# Patient Record
Sex: FEMALE | Race: WHITE | Employment: OTHER | URBAN - METROPOLITAN AREA
[De-identification: names, ages, dates, MRNs, and addresses within clinical notes are randomized per-mention and may not be internally consistent; named-entity substitution may affect disease eponyms.]

---

## 2020-06-30 ENCOUNTER — HOSPITAL ENCOUNTER (OUTPATIENT)
Dept: VASCULAR LAB | Age: 65
Discharge: HOME | End: 2020-06-30
Payer: MEDICARE

## 2020-06-30 PROCEDURE — 93971 EXTREMITY STUDY: CPT

## 2020-10-13 ENCOUNTER — HOSPITAL ENCOUNTER (OUTPATIENT)
Dept: MAMMOGRAPHY | Age: 65
Discharge: HOME | End: 2020-10-18
Payer: MEDICARE

## 2021-03-02 ENCOUNTER — HOSPITAL ENCOUNTER (OUTPATIENT)
Dept: GENERAL RADIOLOGY | Age: 66
Discharge: HOME | End: 2021-03-02
Payer: MEDICARE

## 2021-03-02 ENCOUNTER — HOSPITAL ENCOUNTER (OUTPATIENT)
Age: 66
Discharge: HOME | End: 2021-03-02
Payer: MEDICARE

## 2021-03-02 DIAGNOSIS — M25.50 PAIN IN JOINT, MULTIPLE SITES: ICD-10-CM

## 2021-03-02 PROCEDURE — 73130 X-RAY EXAM OF HAND: CPT

## 2021-04-17 ENCOUNTER — HOSPITAL ENCOUNTER (EMERGENCY)
Age: 66
Discharge: HOME | End: 2021-04-18
Attending: EMERGENCY MEDICINE
Payer: MEDICARE

## 2021-04-17 VITALS
WEIGHT: 190 LBS | SYSTOLIC BLOOD PRESSURE: 160 MMHG | HEIGHT: 66 IN | BODY MASS INDEX: 30.53 KG/M2 | RESPIRATION RATE: 20 BRPM | DIASTOLIC BLOOD PRESSURE: 82 MMHG | TEMPERATURE: 97.5 F | HEART RATE: 91 BPM

## 2021-04-17 DIAGNOSIS — M79.2 NEUROPATHIC PAIN: ICD-10-CM

## 2021-04-17 DIAGNOSIS — E77.8 HYPOPROTEINEMIA (HCC): ICD-10-CM

## 2021-04-17 DIAGNOSIS — R60.9 PERIPHERAL EDEMA: Primary | ICD-10-CM

## 2021-04-17 LAB
A/G RATIO: 1.4 (ref 1.1–2.2)
ALBUMIN SERPL-MCNC: 3.4 G/DL (ref 3.4–5)
ALP BLD-CCNC: 80 U/L (ref 40–129)
ALT SERPL-CCNC: 30 U/L (ref 10–40)
ANION GAP SERPL CALCULATED.3IONS-SCNC: 10 MMOL/L (ref 3–16)
AST SERPL-CCNC: 40 U/L (ref 15–37)
BILIRUB SERPL-MCNC: 0.3 MG/DL (ref 0–1)
BUN BLDV-MCNC: 20 MG/DL (ref 7–20)
CALCIUM SERPL-MCNC: 9 MG/DL (ref 8.3–10.6)
CHLORIDE BLD-SCNC: 110 MMOL/L (ref 99–110)
CO2: 21 MMOL/L (ref 21–32)
CREAT SERPL-MCNC: 1 MG/DL (ref 0.6–1.2)
GFR AFRICAN AMERICAN: >60
GFR NON-AFRICAN AMERICAN: 56
GLOBULIN: 2.5 G/DL
GLUCOSE BLD-MCNC: 121 MG/DL (ref 70–99)
POTASSIUM REFLEX MAGNESIUM: 3.5 MMOL/L (ref 3.5–5.1)
PRO-BNP: 448 PG/ML (ref 0–124)
SODIUM BLD-SCNC: 141 MMOL/L (ref 136–145)
TOTAL PROTEIN: 5.9 G/DL (ref 6.4–8.2)

## 2021-04-17 PROCEDURE — 99282 EMERGENCY DEPT VISIT SF MDM: CPT

## 2021-04-17 PROCEDURE — 83735 ASSAY OF MAGNESIUM: CPT

## 2021-04-17 PROCEDURE — 80053 COMPREHEN METABOLIC PANEL: CPT

## 2021-04-17 PROCEDURE — 85025 COMPLETE CBC W/AUTO DIFF WBC: CPT

## 2021-04-17 PROCEDURE — 83880 ASSAY OF NATRIURETIC PEPTIDE: CPT

## 2021-04-18 LAB
ATYPICAL LYMPHOCYTE RELATIVE PERCENT: 1 % (ref 0–6)
BANDED NEUTROPHILS RELATIVE PERCENT: 3 % (ref 0–7)
BASOPHILS ABSOLUTE: 0 K/UL (ref 0–0.2)
BASOPHILS RELATIVE PERCENT: 0 %
BILIRUBIN URINE: NEGATIVE
BLOOD, URINE: NEGATIVE
CLARITY: CLEAR
COLOR: YELLOW
EOSINOPHILS ABSOLUTE: 0.3 K/UL (ref 0–0.6)
EOSINOPHILS RELATIVE PERCENT: 3 %
EPITHELIAL CELLS, UA: NORMAL /HPF (ref 0–5)
GLUCOSE URINE: NEGATIVE MG/DL
HCT VFR BLD CALC: 37.4 % (ref 36–48)
HEMOGLOBIN: 12.5 G/DL (ref 12–16)
KETONES, URINE: NEGATIVE MG/DL
LEUKOCYTE ESTERASE, URINE: ABNORMAL
LYMPHOCYTES ABSOLUTE: 1.1 K/UL (ref 1–5.1)
LYMPHOCYTES RELATIVE PERCENT: 11 %
MAGNESIUM: 1.9 MG/DL (ref 1.8–2.4)
MCH RBC QN AUTO: 31 PG (ref 26–34)
MCHC RBC AUTO-ENTMCNC: 33.4 G/DL (ref 31–36)
MCV RBC AUTO: 92.6 FL (ref 80–100)
MICROSCOPIC EXAMINATION: YES
MONOCYTES ABSOLUTE: 0.5 K/UL (ref 0–1.3)
MONOCYTES RELATIVE PERCENT: 6 %
NEUTROPHILS ABSOLUTE: 7 K/UL (ref 1.7–7.7)
NEUTROPHILS RELATIVE PERCENT: 76 %
NITRITE, URINE: NEGATIVE
PDW BLD-RTO: 13.4 % (ref 12.4–15.4)
PH UA: 5.5 (ref 5–8)
PLATELET # BLD: 378 K/UL (ref 135–450)
PLATELET SLIDE REVIEW: ADEQUATE
PMV BLD AUTO: 7.4 FL (ref 5–10.5)
PROTEIN UA: NEGATIVE MG/DL
RBC # BLD: 4.04 M/UL (ref 4–5.2)
RBC # BLD: NORMAL 10*6/UL
RBC UA: NORMAL /HPF (ref 0–4)
SPECIFIC GRAVITY UA: 1.02 (ref 1–1.03)
SPECIMEN STATUS: NORMAL
URINE REFLEX TO CULTURE: ABNORMAL
URINE TYPE: ABNORMAL
UROBILINOGEN, URINE: 0.2 E.U./DL
WBC # BLD: 8.9 K/UL (ref 4–11)
WBC UA: NORMAL /HPF (ref 0–5)

## 2021-04-18 PROCEDURE — 81001 URINALYSIS AUTO W/SCOPE: CPT

## 2021-04-18 RX ORDER — FUROSEMIDE 20 MG/1
20 TABLET ORAL DAILY
Qty: 5 TABLET | Refills: 0 | Status: SHIPPED | OUTPATIENT
Start: 2021-04-18 | End: 2021-09-07

## 2021-04-18 NOTE — ED NOTES
Pt arrived via private transport. Pt ambulated without assistance to room 14 from [facility].      Vanessa Velazco RN  04/18/21 0779

## 2021-04-18 NOTE — ED NOTES
Attempted to ambulate pt to the bathroom. Pt unable to walk more than 10 feet. Pt complains of bilateral knee pain. Urine sample obtained from pt and sent to lab.       Wing Calabrese  04/18/21 0057

## 2021-04-18 NOTE — ED PROVIDER NOTES
Jewish service: Not on file     Active member of club or organization: Not on file     Attends meetings of clubs or organizations: Not on file     Relationship status: Not on file    Intimate partner violence     Fear of current or ex partner: Not on file     Emotionally abused: Not on file     Physically abused: Not on file     Forced sexual activity: Not on file   Other Topics Concern    Not on file   Social History Narrative    Not on file       Nursing notes reviewed. ED Triage Vitals   Enc Vitals Group      BP 04/17/21 2302 (!) 160/82      Pulse 04/17/21 2302 91      Resp 04/17/21 2300 20      Temp 04/17/21 2300 97.5 °F (36.4 °C)      Temp Source 04/17/21 2300 Oral      SpO2 --       Weight 04/17/21 2300 190 lb (86.2 kg)      Height 04/17/21 2300 5' 6\" (1.676 m)      Head Circumference --       Peak Flow --       Pain Score --       Pain Loc --       Pain Edu? --       Excl. in GC? --        GENERAL:  Awake, alert. Well developed, well nourished with no apparent distress. HENT:  Normocephalic, Atraumatic, moist mucous membranes. EYES:  Pupils equal round and reactive to light, Conjunctiva normal, extraocular movements normal.  NECK:  No meningeal signs, Supple. CHEST:  Regular rate and rhythm, chest wall non-tender. LUNGS:  Clear to auscultation bilaterally. ABDOMEN:  Soft, non-tender, no rebound, rigidity or guarding, non-distended, normal bowel sounds. No costovertebral angle tenderness to palpation. BACK:  No tenderness. EXTREMITIES:  Normal range of motion, bilateral upper and lower extremity edema, no bony tenderness, no deformity, distal pulses present. SKIN: Warm, dry and intact. NEUROLOGIC: Normal mental status. Moving all extremities to command.      LABS  Labs Reviewed   COMPREHENSIVE METABOLIC PANEL W/ REFLEX TO MG FOR LOW K - Abnormal; Notable for the following components:       Result Value    Glucose 121 (*)     GFR Non- 56 (*)     Total Protein 5.9 (*)     AST 40 (*)     All other components within normal limits    Narrative:     Performed at:  [address],  [place], [address] [employer]   Phone [phone]   BRAIN NATRIURETIC PEPTIDE - Abnormal; Notable for the following components:    Pro- (*)     All other components within normal limits    Narrative:     Performed at:  [address],  [place], [address]   Phone [phone]   URINE RT REFLEX TO CULTURE - Abnormal; Notable for the following components:    Leukocyte Esterase, Urine TRACE (*)     All other components within normal limits    Narrative:     Performed at:  [address],  [place], [address]   Phone [phone]   CBC WITH AUTO DIFFERENTIAL    Narrative:     Performed at:  [address],  [place], [address]   Phone [phone]   SAMPLE POSSIBLE BLOOD BANK TESTING    Narrative:     Performed at:  [address],  [place], [address]   Phone [phone]   MAGNESIUM    Narrative:     Performed at:  [address],  [place], [address]   Phone [phone]   MICROSCOPIC URINALYSIS    Narrative:     Performed at:  [address],  [place], [address]   Phone [phone]       [address]        I advised the patient to return to the emergency department immediately for any new or worsening symptoms, such as chest pain, shortness of breath or fever. The patient voiced agreement and understanding of the treatment plan. I estimate there is LOW risk for COMPARTMENT SYNDROME, DEEP VENOUS THROMBOSIS, SEPTIC ARTHRITIS, TENDON OR NEUROVASCULAR INJURY, thus I consider the discharge disposition reasonable.  Josefa Desai and I have discussed the diagnosis

## 2021-04-29 ENCOUNTER — OFFICE VISIT (OUTPATIENT)
Dept: RHEUMATOLOGY | Age: 66
End: 2021-04-29
Payer: MEDICARE

## 2021-04-29 VITALS
HEIGHT: 66 IN | BODY MASS INDEX: 30.08 KG/M2 | SYSTOLIC BLOOD PRESSURE: 120 MMHG | DIASTOLIC BLOOD PRESSURE: 76 MMHG | WEIGHT: 187.2 LBS

## 2021-04-29 DIAGNOSIS — M06.4 INFLAMMATORY POLYARTHRITIS (HCC): ICD-10-CM

## 2021-04-29 DIAGNOSIS — M06.4 INFLAMMATORY POLYARTHRITIS (HCC): Primary | ICD-10-CM

## 2021-04-29 LAB
ALBUMIN SERPL-MCNC: 4.1 G/DL (ref 3.4–5)
ALP BLD-CCNC: 89 U/L (ref 40–129)
ALT SERPL-CCNC: 18 U/L (ref 10–40)
AST SERPL-CCNC: 21 U/L (ref 15–37)
BILIRUB SERPL-MCNC: 0.4 MG/DL (ref 0–1)
BILIRUBIN DIRECT: <0.2 MG/DL (ref 0–0.3)
BILIRUBIN, INDIRECT: NORMAL MG/DL (ref 0–1)
C-REACTIVE PROTEIN: 64.6 MG/L (ref 0–5.1)
HBV SURFACE AB TITR SER: <3.5 MIU/ML
HEPATITIS B CORE IGM ANTIBODY: NORMAL
HEPATITIS B SURFACE ANTIGEN INTERPRETATION: NORMAL
HEPATITIS C ANTIBODY INTERPRETATION: NORMAL
RHEUMATOID FACTOR: 119 IU/ML
SEDIMENTATION RATE, ERYTHROCYTE: 37 MM/HR (ref 0–30)
TOTAL PROTEIN: 6.4 G/DL (ref 6.4–8.2)

## 2021-04-29 PROCEDURE — 99244 OFF/OP CNSLTJ NEW/EST MOD 40: CPT | Performed by: INTERNAL MEDICINE

## 2021-04-29 PROCEDURE — G8417 CALC BMI ABV UP PARAM F/U: HCPCS | Performed by: INTERNAL MEDICINE

## 2021-04-29 PROCEDURE — G8427 DOCREV CUR MEDS BY ELIG CLIN: HCPCS | Performed by: INTERNAL MEDICINE

## 2021-04-29 RX ORDER — PREDNISONE 10 MG/1
TABLET ORAL
Qty: 60 TABLET | Refills: 0 | Status: SHIPPED | OUTPATIENT
Start: 2021-04-29 | End: 2022-04-06 | Stop reason: SDUPTHER

## 2021-04-29 RX ORDER — ATORVASTATIN CALCIUM 40 MG/1
40 TABLET, FILM COATED ORAL DAILY
COMMUNITY

## 2021-04-29 RX ORDER — LISINOPRIL 20 MG/1
20 TABLET ORAL DAILY
COMMUNITY

## 2021-04-29 NOTE — PROGRESS NOTES
[address], [place]                                                           [address] [place] [address]                                                              (P) [phone] (F)      Note is transcribed using voice recognition software. Inadvertent computerized transcription errors may be present. Patient identification: adan Kumar : 1955,65 y.o. REASON FOR CONSULTATION:  Patient is being seen at the request of  Justin Nolasco / Chuck Alegria DO for evaluation of joint pain and swelling. HISTORY OF PRESENT ILLNESS:  60 y.o. female with remote history of rubella in her early childhood that caused decline in mental function, and hearing loss states that she was perfectly fine earlier this year-then suddenly started noticing pain, swelling, stiffness in her upper and lower extremity joints to the extent that she could not walk and in fact is using walker today. Multiple finger joints, wrist, knees, ankle and feet are swollen and actively inflamed. Symptoms are 24/7, worse at night in the morning. She went to urgent care and ED a few times, was prescribed pain meds and Celebrex with minimal or no relief. No history of any infections. No psoriasis or inflammatory bowel diseases. No rashes, photosensitivity, mucositis, Raynaud's, chest pain, shortness of breath, hypercoagulability. All other review of systems are negative. PMH, PSH,Social history , Meds reviewed. FH-grandmother and aunt has rheumatoid arthritis. PHYSICAL EXAM:    Vitals:    /76   Ht 5' 6\" (1.676 m)   Wt 187 lb 3.2 oz (84.9 kg)   BMI 30.21 kg/m²   AA)x3 well nourished, and well groomed, normal judgement.     MKS:   28 joint JOINT COUNT: Right                                                  Left   Swell Tender Swell Tender   PIP1      x  x   PIP2  xx  x  x  x   PIP3  x  x  xx  xx   PIP4 x x  x  x   PIP5          MCP1           MCP2  xx  xx  x  x   MCP3  x x x  x   MCP4      x  x   MCP5  x  x       Wrist  x  x    x   Elbow   x    x   Shoulder    x      Knee  xx  x  x  x     Loose fist bilaterally. Range of motion are limited in her wrist, shoulders, knees. Ankle are swollen and tender bilaterally. Multiple MTP joints are tender bilaterally with dorsal swelling. Very limited range of motion in her hips. Using walker for ambulation. Skin: No rashes, no induration or skin thickening or nodules. HEENT: Normal lids, lacrimal glands and pupils. Salivary glands reveal no evidence of abnormality. External inspection of the ears and nose within normal limits. Neck:  Chest: Normal effort  Heart:    Abdomen:   Lymph nodes:   Neurologic:     DATA:       ASSESSMENT AND PLAN:  Steffany Rivera was seen today for establish care and joint pain. Diagnoses and all orders for this visit:    Inflammatory [facility] ([facility]  -     C-Reactive Protein; Future  -     Sedimentation Rate; Future  -     Cyclic Citrul Peptide Antibody, IgG; Future  -     Rheumatoid Factor; Future  -     ADIEL; Future  -     Hepatitis B Surface Antibody; Future  -     Hepatitis B Core Antibody, IgM; Future  -     Hepatitis B Surface Antigen; Future  -     Hepatitis C Antibody; Future  -     Hepatic Function Panel; Future    Other orders  -     predniSONE (DELTASONE) 10 MG tablet; 2 tab po daily po daily. Active inflammatory polyarthritis-approximately 3 months duration. Clinically looks like rheumatoid arthritis. Plan-  Obtain work-up as above for inflammatory polyarthritis. Therapeutically-I gave her prednisone taper, take 4 tablets daily for 4 days, then stay on 2 tablets daily thereafter.   I will see her back in couple weeks to go over labs and formulate further treatment plan. All her questions were answered. Patient was accompanied by her half sister and her daughter joined me through [facility]. #################################################################################################  Patient indicates understanding and agrees with the management plan. Total time >60 minutes that includes the following-  Preparing to see the patient such as reviewing patients records, pre-charting, preparing the visit on the same day, performing a medically appropriate history and physical examination, counseling and educating patient about diagnosis, management plan, ordering appropriate testings, prescriptions, communicating findings to other care providers, and documenting clinical information in electronic medical record. I thank you for giving me the opportunity to be involved in [facility] and I look forward following Stephanie Vargas along with you. If you have any questions or concerns please feel free to contact me at any time.   Bogdan Rivas MD 04/29/21

## 2021-04-30 LAB
ANTI-NUCLEAR ANTIBODY (ANA): NEGATIVE
CYCLIC CITRULLINATED PEPTIDE ANTIBODY IGG: 283.2 U/ML (ref 0–2.9)

## 2021-05-10 NOTE — PROGRESS NOTES
[address], [place]                                                           [address] [place] [address]                                                             [phone] (P) [phone] (F)      Note is transcribed using voice recognition software. Inadvertent computerized transcription errors may be present. Patient identification: Janelle Donato, female : 1955,65 y.o. Background information-  1. Seropositive rheumatoid arthritis (, )-onset   2. Childhood history of rubeola-caused hearing loss and mental impairment      Subjective-we met couple of weeks ago for evaluation of inflammatory polyarthritis. Work-up revealed seropositive rheumatoid arthritis. She is doing much better on prednisone taper, still has discomfort in her knees and ankles. Is currently taking 20 mg of prednisone a day. Tolerating medications well. Is no longer on Celebrex and acetaminophen. No other complaints or concerns. PMH, PSH,Social history , Meds reviewed. FH-grandmother and aunt has rheumatoid arthritis. Current Outpatient Medications   Medication Sig Dispense Refill    folic acid (FOLVITE) 1 MG tablet Take 1 tab po daily. 30 tablet 3    methotrexate (RHEUMATREX) 2.5 MG chemo tablet Week 1: 4 tablets by mouth once a week. Week 2: 5 tablets /week. Week 3: 6 tablets/ week, thereafter: 7 tablets/ week. Same day of the week every week. Safety alert: Labs as recommended by prescribing MD. 22 tablet 0    predniSONE (DELTASONE) 10 MG tablet Take 1 tab po daily.  30 tablet 1    lisinopril (PRINIVIL;ZESTRIL) 20 MG tablet Take 20 mg by mouth daily      atorvastatin (LIPITOR) 40 MG tablet Take 40 mg by mouth daily      predniSONE (DELTASONE) 10 MG tablet 2 tab po daily po daily. 60 tablet 0    furosemide (LASIX) 20 MG tablet Take 1 tablet by mouth daily for 5 days 5 tablet 0     No current facility-administered medications for this visit. PHYSICAL EXAM:    Vitals:    BP 98/68   Ht 5' 6\" (1.676 m)   Wt 187 lb (84.8 kg)   BMI 30.18 kg/m²   AA)x3 well nourished, and well groomed, normal judgement. MKS:   28 joint JOINT COUNT:                               Right                                                  Left   Swell Tender Swell Tender   PIP1           PIP2          PIP3           PIP4         PIP5          MCP1           MCP2  x  x      MCP3         MCP4           MCP5           Wrist          Elbow          Shoulder          Knee    x    x     Subjective discomfort in both ankle joint lines. Otherwise has full range of motion in upper and lower extremity joints. Normal gait and muscle strength in upper and lower extremities. Skin: No rashes, no induration or skin thickening or nodules. HEENT: Normal lids, lacrimal glands and pupils. Salivary glands reveal no evidence of abnormality.  External inspection of the ears and nose within normal     DATA:   Lab Results   Component Value Date    WBC 8.9 04/17/2021    HGB 12.5 04/17/2021    HCT 37.4 04/17/2021    MCV 92.6 04/17/2021     04/17/2021     Lab Results   Component Value Date     04/17/2021    K 3.5 04/17/2021     04/17/2021    CO2 21 04/17/2021    BUN 20 04/17/2021    CREATININE 1.0 04/17/2021    GLUCOSE 121 (H) 04/17/2021    CALCIUM 9.0 04/17/2021    PROT 6.4 04/29/2021    LABALBU 4.1 04/29/2021    BILITOT 0.4 04/29/2021    ALKPHOS 89 04/29/2021    AST 21 04/29/2021    ALT 18 04/29/2021    LABGLOM 56 (A) 04/17/2021    GFRAA >60 04/17/2021    AGRATIO 1.4 04/17/2021    GLOB 2.5 04/17/2021       Lab Results   Component Value Date    SEDRATE 37 (H) 04/29/2021     Lab Results   Component Value Date    CRP 64.6 (H) 04/29/2021       ASSESSMENT AND PLAN:  Avtar Baker was seen today for joint pain.    Diagnoses and all orders for this visit:    Seropositive rheumatoid arthritis ([facility]    High risk medication use  -     AST(SGOT) & ALT(SGPT); Standing  -     CBC Auto Differential; Standing  -     C-Reactive Protein; Standing  -     Creatinine, Serum; Standing  -     Sedimentation Rate; Standing    Other orders  -     folic acid (FOLVITE) 1 MG tablet; Take 1 tab po daily. -     methotrexate (RHEUMATREX) 2.5 MG chemo tablet; Week 1: 4 tablets by mouth once a week. Week 2: 5 tablets /week. Week 3: 6 tablets/ week, thereafter: 7 tablets/ week. Same day of the week every week. Safety alert: Labs as recommended by prescribing MD.  -     predniSONE (DELTASONE) 10 MG tablet; Take 1 tab po daily. Seropositive rheumatoid arthritis-improving on prednisone taper. Plan-  Went over various treatment options for inflammatory arthritis including non-biologic and biologic disease modifiers. Recommend Methotrexate as a first line of agent. Risk, benefits and side effects fully explained including but not limited the immune suppression, infection risk, BM,GI/Liver/lung/renal toxicity, malignancy. This is a high risk medication- requires periodic blood monitoring & office visits,  alcohol abstinence, hold in case of any infection. Please read package insert and call us with any question. Methotrexate, folic acid and prednisone sent to pharmacy. Lab work to be completed in 4 weeks, call us for med refill, office visit in 2 months. Patient verbalizes the understanding. #################################################################################################  Patient indicates understanding and agrees with the management plan.   Total time >42 minutes that includes the following-  Preparing to see the patient such as reviewing patients records, pre-charting, preparing the visit on the same day, performing a medically appropriate history and physical examination, counseling and educating patient about diagnosis, management plan, ordering appropriate testings, prescriptions, communicating findings to other care providers, and documenting clinical information in electronic medical record. I thank you for giving me the opportunity to be involved in [facility] and I look forward following Ria El along with you. If you have any questions or concerns please feel free to contact me at any time.   Va Bryant MD 05/11/21

## 2021-05-11 ENCOUNTER — OFFICE VISIT (OUTPATIENT)
Dept: RHEUMATOLOGY | Age: 66
End: 2021-05-11
Payer: MEDICARE

## 2021-05-11 VITALS
SYSTOLIC BLOOD PRESSURE: 98 MMHG | DIASTOLIC BLOOD PRESSURE: 68 MMHG | WEIGHT: 187 LBS | HEIGHT: 66 IN | BODY MASS INDEX: 30.05 KG/M2

## 2021-05-11 DIAGNOSIS — M05.9 SEROPOSITIVE RHEUMATOID ARTHRITIS (HCC): Primary | ICD-10-CM

## 2021-05-11 DIAGNOSIS — Z79.899 HIGH RISK MEDICATION USE: ICD-10-CM

## 2021-05-11 PROCEDURE — 99215 OFFICE O/P EST HI 40 MIN: CPT | Performed by: INTERNAL MEDICINE

## 2021-05-11 PROCEDURE — 1036F TOBACCO NON-USER: CPT | Performed by: INTERNAL MEDICINE

## 2021-05-11 PROCEDURE — 3017F COLORECTAL CA SCREEN DOC REV: CPT | Performed by: INTERNAL MEDICINE

## 2021-05-11 PROCEDURE — G8417 CALC BMI ABV UP PARAM F/U: HCPCS | Performed by: INTERNAL MEDICINE

## 2021-05-11 PROCEDURE — G8427 DOCREV CUR MEDS BY ELIG CLIN: HCPCS | Performed by: INTERNAL MEDICINE

## 2021-05-11 RX ORDER — PREDNISONE 10 MG/1
TABLET ORAL
Qty: 30 TABLET | Refills: 1 | Status: SHIPPED | OUTPATIENT
Start: 2021-05-11 | End: 2022-03-29 | Stop reason: SDUPTHER

## 2021-05-11 RX ORDER — FOLIC ACID 1 MG/1
TABLET ORAL
Qty: 30 TABLET | Refills: 3 | Status: SHIPPED | OUTPATIENT
Start: 2021-05-11 | End: 2021-09-07 | Stop reason: SDUPTHER

## 2021-05-11 NOTE — PATIENT INSTRUCTIONS
Methotrexate instructions-    1. PLEASE TAKE ALL TABLETS AT ONCE OR IF YOU ARE UNABLE TO TOLERATE ALL AT ONCE- CAN SPLIT HALF OF THE DOSE IN THE MORNING AND HALF IN THE EVENING OF THE SAME DAY. Week 1- 4 tablets by mouth once a week       Week 2- 5 tablets by mouth once a week        Week 3- 6 tablets by mouth once a week       Week 4 onwards take 7 tablets by mouth once a week. 2. Folic acid 1 mg daily as long as you are taking Methotrexate. 3. If you are unable to tolerate full dose of Methotrexate due to nausea/vomiting, diarrhea or other side effects, do not increase the dose instead decrease the number of tablets you are taking once a week. 4.  Blood work monthly ( CB CD, CMP)  to monitor systemic side effects. 5. IF YOU DO NOT HAVE METHOTREXATE REFILLS< PLEASE CALL OFFICE. PLEASE CALL WITH ANY QUESTIONS OR CONCERNS    Patient Education        methotrexate (oral)  Pronunciation:  meth oh TREX ate  Brand:  [employer]  What is the most important information I should know about methotrexate? Methotrexate may cause injury or death to an unborn baby and should not be used during pregnancy to treat arthritis or psoriasis. Methotrexate is sometimes used to treat cancer during pregnancy. Tell your doctor if you are pregnant or plan to become pregnant. Do not use methotrexate to treat psoriasis or rheumatoid arthritis if you have low blood cell counts, a weak immune system, alcoholism or chronic liver disease, or if you are breastfeeding. YOU MAY NOT NEED TO TAKE METHOTREXATE EVERY DAY. Some people have  after incorrectly taking methotrexate every day. You must use the correct dose for your condition. Methotrexate can cause serious or fatal side effects.  Tell your doctor if you have diarrhea, mouth sores, cough, shortness of breath, upper stomach pain, dark urine, numbness or tingling, muscle weakness, confusion, seizure, or skin rash that spreads and causes blistering and is able to get pregnant. Keep using birth control for at least 3 months after your last dose. · Tell your doctor right away if a pregnancy occurs while either the mother or the father is using methotrexate. This medicine may affect fertility (ability to have children) in both men and women. However, it is important to use birth control to prevent pregnancy because methotrexate may harm the baby if a pregnancy does occur. Do not breastfeed while using this medicine,  and for at least 1 week after your last dose. Do not give this medicine to a child without the advice of a doctor. How should I take methotrexate? YOU MAY NOT NEED TO TAKE METHOTREXATE EVERY DAY. This medicine is sometimes taken only once per week, or 2 to 4 times per week. Some conditions are treated daily for just a few days followed by a rest period of 1 week or longer before taking the medicine again. You must use the correct dose. Some people have  after incorrectly taking methotrexate every day. Follow all directions on your prescription label and read all medication guides or instruction sheets. Use the medicine exactly as directed. Measure liquid medicine carefully. Use the dosing syringe provided, or use a medicine dose-measuring device (not a kitchen spoon). Methotrexate can be toxic to your organs, and may lower your blood cell counts. You will need frequent medical tests, and you may need an occasional liver biopsy or chest X-ray. Your cancer treatments may be delayed based on the results. If you need to be sedated for dental work, tell your dentist you currently use methotrexate. Store methotrexate at room temperature away from moisture, heat, and light. You may store [place] in a refrigerator or at room temperature. Avoid freezing or high heat. Throw away any unused Xatmep after 60 days if kept at room temperature. What happens if I miss a dose? Call your doctor for instructions if you miss a dose of methotrexate.   What happens if I overdose? Seek emergency medical attention or call the Poison Help line at 1-279.850.7100. An overdose of methotrexate can be fatal.  Overdose symptoms may include bruising or unusual bleeding, mouth sores, vomiting, little or no urination, bloody or tarry stools, or coughing up blood or vomit that looks like coffee grounds. What should I avoid while taking methotrexate? Avoid drinking alcohol. It may increase your risk of liver damage. Do not receive a \"live\" vaccine while using methotrexate, or you could develop a serious infection. Live vaccines include measles, mumps, rubella (MMR), rotavirus, typhoid, yellow fever, varicella (chickenpox), zoster (shingles), and nasal flu (influenza) vaccine. Methotrexate could make you sunburn more easily. Avoid sunlight or tanning beds. Wear protective clothing and use sunscreen (SPF 30 or higher) when you are outdoors. What are the possible side effects of methotrexate? Get emergency medical help if you have signs of an allergic reaction (hives, difficult breathing, swelling in your face or throat) or a severe skin reaction (fever, sore throat, burning in your eyes, skin pain, red or purple skin rash that spreads and causes blistering and peeling). Methotrexate can cause serious or fatal side effects.  Call your doctor at once if you have:  · sudden chest pain, wheezing, dry cough, cough with mucus, chest pain, feeling short of breath;  · fever, chills, swollen lymph glands, night sweats, weight loss;  · blisters or ulcers in your mouth, red or swollen gums, trouble swallowing;  · vomiting, diarrhea, blood in your urine or stools;  · skin changes such as redness, warmth, swelling, or oozing;  · low blood cell counts --fever, chills, tiredness, mouth sores, skin sores, easy bruising, unusual bleeding, pale skin, cold hands and feet, feeling light-headed or short of breath;  · kidney problems --little or no urination, swelling in your feet or ankles;  · liver problems --swelling around your midsection, right-sided upper stomach pain, nausea, loss of appetite, dark urine, jaundice (yellowing of the skin or eyes);  · nerve problems --confusion, weakness, drowsiness, coordination problems, feeling irritable, headache, neck stiffness, vision problems, loss of movement in any part of your body, seizure; or  · signs of tumor cell breakdown --tiredness, weakness, muscle cramps, nausea, vomiting, diarrhea, fast or slow heart rate, tingling in your hands and feet or around your mouth. Common side effects may include:  · fever, chills, tiredness, not feeling well;  · low blood cell counts;  · mouth sores;  · nausea, stomach pain;  · abnormal liver function tests;  · hair loss;  · burning skin lesions; or  · being more sensitive to light. This is not a complete list of side effects and others may occur. Call your doctor for medical advice about side effects. You may report side effects to FDA at 8-688-FDA-9750. What other drugs will affect methotrexate? Tell your doctor about all your other medicines, especially:  · an antibiotic or sulfa drug;  · folic acid;  · mercaptopurine;  · theophylline;  · NSAIDs (nonsteroidal anti-inflammatory drugs) --aspirin, ibuprofen (Advil, Motrin), naproxen (Aleve), celecoxib, diclofenac, indomethacin, meloxicam, and others;  · stomach acid reducers --esomeprazole, lansoprazole, omeprazole, pantoprazole, Nexium, Prilosec, Protonix, and others. Many drugs can affect methotrexate. This includes prescription and over-the-counter medicines, vitamins, and herbal products. Not all possible interactions are listed here. Tell your doctor about all your current medicines and any medicine you start or stop using. Where can I get more information? Your pharmacist can provide more information about methotrexate.   Remember, keep this and all other medicines out of the reach of children, never share your medicines with others, and use this medication

## 2021-06-04 ENCOUNTER — HOSPITAL ENCOUNTER (OUTPATIENT)
Age: 66
Discharge: HOME | End: 2021-06-04
Payer: MEDICARE

## 2021-06-04 DIAGNOSIS — Z79.899 HIGH RISK MEDICATION USE: ICD-10-CM

## 2021-06-04 LAB
ALT SERPL-CCNC: 26 U/L (ref 10–40)
AST SERPL-CCNC: 22 U/L (ref 15–37)
BASOPHILS ABSOLUTE: 0.1 K/UL (ref 0–0.2)
BASOPHILS RELATIVE PERCENT: 0.8 %
CREAT SERPL-MCNC: 0.8 MG/DL (ref 0.6–1.2)
EOSINOPHILS ABSOLUTE: 0 K/UL (ref 0–0.6)
EOSINOPHILS RELATIVE PERCENT: 0.4 %
GFR AFRICAN AMERICAN: >60
GFR NON-AFRICAN AMERICAN: >60
HCT VFR BLD CALC: 42.5 % (ref 36–48)
HEMOGLOBIN: 14.1 G/DL (ref 12–16)
LYMPHOCYTES ABSOLUTE: 1 K/UL (ref 1–5.1)
LYMPHOCYTES RELATIVE PERCENT: 11.6 %
MCH RBC QN AUTO: 31.5 PG (ref 26–34)
MCHC RBC AUTO-ENTMCNC: 33.1 G/DL (ref 31–36)
MCV RBC AUTO: 95.1 FL (ref 80–100)
MONOCYTES ABSOLUTE: 0.5 K/UL (ref 0–1.3)
MONOCYTES RELATIVE PERCENT: 6.2 %
NEUTROPHILS ABSOLUTE: 6.8 K/UL (ref 1.7–7.7)
NEUTROPHILS RELATIVE PERCENT: 81 %
PDW BLD-RTO: 15.8 % (ref 12.4–15.4)
PLATELET # BLD: 263 K/UL (ref 135–450)
PMV BLD AUTO: 7.9 FL (ref 5–10.5)
RBC # BLD: 4.47 M/UL (ref 4–5.2)
SEDIMENTATION RATE, ERYTHROCYTE: 10 MM/HR (ref 0–30)
WBC # BLD: 8.4 K/UL (ref 4–11)

## 2021-06-04 PROCEDURE — 85652 RBC SED RATE AUTOMATED: CPT

## 2021-06-04 PROCEDURE — 84460 ALANINE AMINO (ALT) (SGPT): CPT

## 2021-06-04 PROCEDURE — 36415 COLL VENOUS BLD VENIPUNCTURE: CPT

## 2021-06-04 PROCEDURE — 82565 ASSAY OF CREATININE: CPT

## 2021-06-04 PROCEDURE — 85025 COMPLETE CBC W/AUTO DIFF WBC: CPT

## 2021-06-04 PROCEDURE — 86140 C-REACTIVE PROTEIN: CPT

## 2021-06-04 PROCEDURE — 84450 TRANSFERASE (AST) (SGOT): CPT

## 2021-06-05 LAB — C-REACTIVE PROTEIN: <3 MG/L (ref 0–5.1)

## 2021-07-06 ENCOUNTER — OFFICE VISIT (OUTPATIENT)
Dept: RHEUMATOLOGY | Age: 66
End: 2021-07-06
Payer: MEDICARE

## 2021-07-06 VITALS
HEIGHT: 66 IN | SYSTOLIC BLOOD PRESSURE: 124 MMHG | DIASTOLIC BLOOD PRESSURE: 84 MMHG | WEIGHT: 191 LBS | BODY MASS INDEX: 30.7 KG/M2

## 2021-07-06 DIAGNOSIS — Z79.899 HIGH RISK MEDICATION USE: ICD-10-CM

## 2021-07-06 DIAGNOSIS — M05.9 SEROPOSITIVE RHEUMATOID ARTHRITIS (HCC): Primary | ICD-10-CM

## 2021-07-06 PROCEDURE — 1036F TOBACCO NON-USER: CPT | Performed by: INTERNAL MEDICINE

## 2021-07-06 PROCEDURE — 99214 OFFICE O/P EST MOD 30 MIN: CPT | Performed by: INTERNAL MEDICINE

## 2021-07-06 PROCEDURE — 1123F ACP DISCUSS/DSCN MKR DOCD: CPT | Performed by: INTERNAL MEDICINE

## 2021-07-06 PROCEDURE — 4040F PNEUMOC VAC/ADMIN/RCVD: CPT | Performed by: INTERNAL MEDICINE

## 2021-07-06 PROCEDURE — G8400 PT W/DXA NO RESULTS DOC: HCPCS | Performed by: INTERNAL MEDICINE

## 2021-07-06 PROCEDURE — 3017F COLORECTAL CA SCREEN DOC REV: CPT | Performed by: INTERNAL MEDICINE

## 2021-07-06 PROCEDURE — G8427 DOCREV CUR MEDS BY ELIG CLIN: HCPCS | Performed by: INTERNAL MEDICINE

## 2021-07-06 PROCEDURE — 1090F PRES/ABSN URINE INCON ASSESS: CPT | Performed by: INTERNAL MEDICINE

## 2021-07-06 PROCEDURE — G8417 CALC BMI ABV UP PARAM F/U: HCPCS | Performed by: INTERNAL MEDICINE

## 2021-07-06 RX ORDER — FOLIC ACID 1 MG/1
TABLET ORAL
Qty: 90 TABLET | Refills: 3 | Status: SHIPPED | OUTPATIENT
Start: 2021-07-06 | End: 2022-10-10 | Stop reason: SDUPTHER

## 2021-07-06 RX ORDER — PREDNISONE 10 MG/1
TABLET ORAL
Qty: 15 TABLET | Refills: 0 | Status: SHIPPED | OUTPATIENT
Start: 2021-07-06 | End: 2021-09-24 | Stop reason: SDUPTHER

## 2021-07-06 NOTE — PROGRESS NOTES
[facility], [place]                                                           [address] [place] [address] [phone] (P) [phone] (F)      Note is transcribed using voice recognition software. Inadvertent computerized transcription errors may be present. Patient identification: Fannie Cbua, female : 1955,66 y.o. Background information-  1. Seropositive rheumatoid arthritis (, )-onset   2. 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 Normal ADLs and recreational activities. Still taking 10 mg of prednisone, continues to take methotrexate 7 tablets weekly. Tolerating medications well. Safety labs have been stable. PMH, PSH,Social history , Meds reviewed. FH-grandmother and aunt has rheumatoid arthritis. Current Outpatient Medications   Medication Sig Dispense Refill    predniSONE (DELTASONE) 10 MG tablet Take 2 tab daily 4 days. 1 tab po daily 4 days. 1/2 tab po daily x 4 days and stop. 15 tablet 0    folic acid (FOLVITE) 1 MG tablet Take 1 tab po daily. 90 tablet 3    methotrexate (RHEUMATREX) 2.5 MG chemo tablet Take 8 tablets once a week on the same day. Divide the dose in the same day 4 in AM and 4 PM. 751 tablet 0    folic acid (FOLVITE) 1 MG tablet Take 1 tab po daily. 30 tablet 3    predniSONE (DELTASONE) 10 MG tablet Take 1 tab po daily.  30 tablet 1    lisinopril (PRINIVIL;ZESTRIL) 20 MG tablet Take 20 mg by mouth daily      atorvastatin (LIPITOR) 40 MG tablet Take 40 mg by mouth daily      predniSONE (DELTASONE) 10 MG tablet 2 tab po daily po daily. 60 tablet 0    furosemide (LASIX) 20 MG tablet Take 1 tablet by mouth daily for 5 days 5 tablet 0     No current facility-administered medications for this visit. PHYSICAL EXAM:    Vitals:    /84   Ht 5' 6\" (1.676 m)   Wt 191 lb (86.6 kg)   BMI 30.83 kg/m²   AA)x3 well nourished, and well groomed, normal judgement. MKS:   28 joint JOINT COUNT:                               Right                                                  Left   Swell Tender Swell Tender   PIP1           PIP2          PIP3   x        PIP4         PIP5          MCP1           MCP2  x  x      MCP3         MCP4           MCP5           Wrist          Elbow          Shoulder          Knee           Other than above-normal musculoskeletal exam.  Skin: No rashes, no induration or skin thickening or nodules. DATA:   Lab Results   Component Value Date    WBC 8.4 06/04/2021    HGB 14.1 06/04/2021    HCT 42.5 06/04/2021    MCV 95.1 06/04/2021     06/04/2021     Lab Results   Component Value Date     04/17/2021    K 3.5 04/17/2021     04/17/2021    CO2 21 04/17/2021    BUN 20 04/17/2021    CREATININE 0.8 06/04/2021    GLUCOSE 121 (H) 04/17/2021    CALCIUM 9.0 04/17/2021    PROT 6.4 04/29/2021    LABALBU 4.1 04/29/2021    BILITOT 0.4 04/29/2021    ALKPHOS 89 04/29/2021    AST 22 06/04/2021    ALT 26 06/04/2021    LABGLOM >60 06/04/2021    GFRAA >60 06/04/2021    AGRATIO 1.4 04/17/2021    GLOB 2.5 04/17/2021       Lab Results   Component Value Date    SEDRATE 10 06/04/2021     Lab Results   Component Value Date    CRP <3.0 06/04/2021       ASSESSMENT AND PLAN:  Marielle Maddox was seen today for follow-up. Diagnoses and all orders for this visit:    Seropositive rheumatoid arthritis ([facility] [address]    High risk medication use  -     AST(SGOT) & ALT(SGPT);  Standing  -     CBC Auto Differential; Standing  -     C-Reactive Protein; Standing  -     Creatinine, Serum; Standing  -     Sedimentation Rate; Standing    Other orders  -     Discontinue: methotrexate (RHEUMATREX) 2.5 MG chemo tablet; Take 8 tablets once a week on the same day. Divide the dose in the same day 4 in AM and 4 PM.  -     predniSONE (DELTASONE) 10 MG tablet; Take 2 tab daily 4 days. 1 tab po daily 4 days. 1/2 tab po daily x 4 days and stop. -     folic acid (FOLVITE) 1 MG tablet; Take 1 tab po daily. -     methotrexate (RHEUMATREX) 2.5 MG chemo tablet; Take 8 tablets once a week on the same day. Divide the dose in the same day 4 in AM and 4 PM.      Seropositive rheumatoid arthritis-doing well. Taper prednisone and discontinue. Increase methotrexate 8 tablets once a week. Blood work today, recheck labs in couple of months at the time of visit. All questions were answered. Patient verbalizes the understanding. #################################################################################################  Patient indicates understanding and agrees with the management plan. Total time >35 minutes that includes the following-  Preparing to see the patient such as reviewing patients records, pre-charting, preparing the visit on the same day, performing a medically appropriate history and physical examination, counseling and educating patient about diagnosis, management plan, ordering appropriate testings, prescriptions, communicating findings to other care providers, and documenting clinical information in electronic medical record. I thank you for giving me the opportunity to be involved in [facility] and I look forward following Esperanza Hernandez along with you. If you have any questions or concerns please feel free to contact me at any time.   Ajith Zelaya MD 07/06/21

## 2021-07-07 LAB
ALT SERPL-CCNC: 19 U/L (ref 10–40)
AST SERPL-CCNC: 18 U/L (ref 15–37)
BASOPHILS ABSOLUTE: 0 K/UL (ref 0–0.2)
BASOPHILS RELATIVE PERCENT: 0.6 %
C-REACTIVE PROTEIN: <3 MG/L (ref 0–5.1)
CREAT SERPL-MCNC: 0.9 MG/DL (ref 0.6–1.2)
EOSINOPHILS ABSOLUTE: 0.1 K/UL (ref 0–0.6)
EOSINOPHILS RELATIVE PERCENT: 1.1 %
GFR AFRICAN AMERICAN: >60
GFR NON-AFRICAN AMERICAN: >60
HCT VFR BLD CALC: 42.5 % (ref 36–48)
HEMOGLOBIN: 14.3 G/DL (ref 12–16)
LYMPHOCYTES ABSOLUTE: 1.7 K/UL (ref 1–5.1)
LYMPHOCYTES RELATIVE PERCENT: 20.6 %
MCH RBC QN AUTO: 33.3 PG (ref 26–34)
MCHC RBC AUTO-ENTMCNC: 33.5 G/DL (ref 31–36)
MCV RBC AUTO: 99.5 FL (ref 80–100)
MONOCYTES ABSOLUTE: 0.8 K/UL (ref 0–1.3)
MONOCYTES RELATIVE PERCENT: 9 %
NEUTROPHILS ABSOLUTE: 5.8 K/UL (ref 1.7–7.7)
NEUTROPHILS RELATIVE PERCENT: 68.7 %
PDW BLD-RTO: 17.5 % (ref 12.4–15.4)
PLATELET # BLD: 228 K/UL (ref 135–450)
PMV BLD AUTO: 8.7 FL (ref 5–10.5)
RBC # BLD: 4.28 M/UL (ref 4–5.2)
SEDIMENTATION RATE, ERYTHROCYTE: 7 MM/HR (ref 0–30)
WBC # BLD: 8.5 K/UL (ref 4–11)

## 2021-09-07 ENCOUNTER — OFFICE VISIT (OUTPATIENT)
Dept: RHEUMATOLOGY | Age: 66
End: 2021-09-07
Payer: MEDICARE

## 2021-09-07 VITALS
HEIGHT: 66 IN | BODY MASS INDEX: 30.7 KG/M2 | WEIGHT: 191 LBS | SYSTOLIC BLOOD PRESSURE: 110 MMHG | DIASTOLIC BLOOD PRESSURE: 74 MMHG

## 2021-09-07 DIAGNOSIS — M05.9 SEROPOSITIVE RHEUMATOID ARTHRITIS (HCC): Primary | ICD-10-CM

## 2021-09-07 DIAGNOSIS — Z79.899 HIGH RISK MEDICATION USE: ICD-10-CM

## 2021-09-07 PROCEDURE — 3017F COLORECTAL CA SCREEN DOC REV: CPT | Performed by: INTERNAL MEDICINE

## 2021-09-07 PROCEDURE — G8427 DOCREV CUR MEDS BY ELIG CLIN: HCPCS | Performed by: INTERNAL MEDICINE

## 2021-09-07 PROCEDURE — G8400 PT W/DXA NO RESULTS DOC: HCPCS | Performed by: INTERNAL MEDICINE

## 2021-09-07 PROCEDURE — 1036F TOBACCO NON-USER: CPT | Performed by: INTERNAL MEDICINE

## 2021-09-07 PROCEDURE — 99215 OFFICE O/P EST HI 40 MIN: CPT | Performed by: INTERNAL MEDICINE

## 2021-09-07 PROCEDURE — 4040F PNEUMOC VAC/ADMIN/RCVD: CPT | Performed by: INTERNAL MEDICINE

## 2021-09-07 PROCEDURE — G8417 CALC BMI ABV UP PARAM F/U: HCPCS | Performed by: INTERNAL MEDICINE

## 2021-09-07 PROCEDURE — 1123F ACP DISCUSS/DSCN MKR DOCD: CPT | Performed by: INTERNAL MEDICINE

## 2021-09-07 PROCEDURE — 1090F PRES/ABSN URINE INCON ASSESS: CPT | Performed by: INTERNAL MEDICINE

## 2021-09-07 RX ORDER — FOLIC ACID 1 MG/1
TABLET ORAL
Qty: 30 TABLET | Refills: 3 | Status: SHIPPED | OUTPATIENT
Start: 2021-09-07 | End: 2022-01-04

## 2021-09-07 NOTE — PROGRESS NOTES
[address], [place]                                                           [address] [place] [address]                                                             [phone] (P) [phone] (F)      Note is transcribed using voice recognition software. Inadvertent computerized transcription errors may be present. Patient identification: [place]y [place], female : 1955,66 y.o. Background information-  1. Seropositive rheumatoid arthritis (, )-onset   2. 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 Compliant with medications. Tolerating meds well. ADLs at times are affected by actively inflamed joints. Safety labs have been normal    PMH, PSH,Social history , Meds reviewed. FH-grandmother and aunt has rheumatoid arthritis. Current Outpatient Medications   Medication Sig Dispense Refill    methotrexate (RHEUMATREX) 2.5 MG chemo tablet Take 8 Tabs po once a week, same day every week,  split dose 6-8 hours apart on the same day 32 tablet 2    folic acid (FOLVITE) 1 MG tablet Take 1 tab po daily. 30 tablet 3    predniSONE (DELTASONE) 10 MG tablet Take 2 tab daily 4 days. 1 tab po daily 4 days. 1/2 tab po daily x 4 days and stop. 15 tablet 0    folic acid (FOLVITE) 1 MG tablet Take 1 tab po daily. 90 tablet 3    methotrexate (RHEUMATREX) 2.5 MG chemo tablet Take 8 tablets once a week on the same day. Divide the dose in the same day 4 in AM and 4 PM. 104 tablet 0    predniSONE (DELTASONE) 10 MG tablet Take 1 tab po daily.  30 tablet 1    lisinopril (PRINIVIL;ZESTRIL) 20 MG tablet Take 20 mg by mouth daily      atorvastatin (LIPITOR) 40 MG tablet Take 40 mg by mouth daily      predniSONE (DELTASONE) 10 MG tablet 2 tab po daily po daily. 60 tablet 0    furosemide (LASIX) 20 MG tablet Take 1 tablet by mouth daily for 5 days 5 tablet 0     No current facility-administered medications for this visit. PHYSICAL EXAM:    Vitals:    /74   Ht 5' 6\" (1.676 m)   Wt 191 lb (86.6 kg)   BMI 30.83 kg/m²   AA)x3 well nourished, and well groomed, normal judgement. MKS:   28 joint JOINT COUNT:                               Right                                                  Left   Swell Tender Swell Tender   PIP1           PIP2          PIP3 x x        PIP4  x       PIP5          MCP1           MCP2  x  x   x   MCP3   x      MCP4    x       MCP5           Wrist   x  x  x   Elbow          Shoulder          Knee             Subjective discomfort across all MCPs, PIPs, wrists, MCPs and ankle joints. Other than above, no overtly swollen joints. Loose fist right hand. Skin: No rashes, no induration or skin thickening or nodules.         DATA:   Lab Results   Component Value Date    WBC 8.5 07/06/2021    HGB 14.3 07/06/2021    HCT 42.5 07/06/2021    MCV 99.5 07/06/2021     07/06/2021     Lab Results   Component Value Date     04/17/2021    K 3.5 04/17/2021     04/17/2021    CO2 21 04/17/2021    BUN 20 04/17/2021    CREATININE 0.9 07/06/2021    GLUCOSE 121 (H) 04/17/2021    CALCIUM 9.0 04/17/2021    PROT 6.4 04/29/2021    LABALBU 4.1 04/29/2021    BILITOT 0.4 04/29/2021    ALKPHOS 89 04/29/2021    AST 18 07/06/2021    ALT 19 07/06/2021    LABGLOM >60 07/06/2021    GFRAA >60 07/06/2021    AGRATIO 1.4 04/17/2021    GLOB 2.5 04/17/2021       Lab Results   Component Value Date    SEDRATE 7 07/06/2021     Lab Results   Component Value Date    CRP <3.0 07/06/2021       ASSESSMENT AND PLAN:  Perri Apley was seen today for follow-up. Diagnoses and all orders for this visit:    Seropositive rheumatoid arthritis ([facility] [address]    High risk medication use  -     Comprehensive Metabolic Panel; Future  -     CBC Auto Differential; Future  -     C-Reactive Protein; Future  -     Sedimentation Rate; Future  -     Quantiferon, Incubated; Future    Other orders  -     methotrexate (RHEUMATREX) 2.5 MG chemo tablet; Take 8 Tabs po once a week, same day every week,  split dose 6-8 hours apart on the same day  -     folic acid (FOLVITE) 1 MG tablet; Take 1 tab po daily. Seropositive rheumatoid arthritis-improving but persistent activity on optimal dose of methotrexate, continues to need prednisone as needed. We discussed about optimizing treatment for rheumatoid arthritis. Given that she has difficulty in handling shipment, and not able to inject herself, IV therapy would perhaps be the best option in terms of treatment compliance and convenience. I gave her information on various medications-abatacept, Simponi Lia Tavares is dosed every 2 monthly, might probably be the most convenient given that she lives far from here. Side effects, directions, monitoring of medications discussed, reading information provided. Her niece who helps with her appointment, will talk to her daughter, and let us know about her decision. Meantime, check labs as above. Prescriptions authorized. Recommend booster dose of COVID-19 vaccine, and age-specific vaccinations. All questions were answered. Patient verbalizes the understanding. #################################################################################################  Patient indicates understanding and agrees with the management plan.   Total time >40 minutes that includes the following-  Preparing to see the patient such as reviewing patients records, pre-charting, preparing the visit on the same day, performing a medically appropriate history and physical examination, counseling and educating patient about diagnosis, management plan, ordering appropriate testings, prescriptions, communicating findings to other care providers, and documenting clinical information in electronic medical record. I thank you for giving me the opportunity to be involved in [facility] and I look forward following Dottie Robin along with you. If you have any questions or concerns please feel free to contact me at any time.   Maria Teresa Gallo MD 09/07/21

## 2021-09-07 NOTE — PATIENT INSTRUCTIONS
Patient Education        Patient Education        abatacept  Pronunciation:  a BAY ta sept  Brand:  Jose  What is the most important information I should know about abatacept? Follow all directions on your medicine label and package. Tell each of your healthcare providers about all your medical conditions, allergies, and all medicines you use. What is abatacept? Abatacept is used to treat symptoms of rheumatoid arthritis, and to prevent joint damage caused by these conditions. This medicine is for adults and children at least 3years old. Abatacept is also used to treat active psoriatic arthritis in adults. Abatacept is not a cure for any autoimmune disorder and will only treat the symptoms of your condition. Abatacept may also be used for purposes not listed in this medication guide. What should I discuss with my healthcare provider before using abatacept? You should not use abatacept if you are allergic to it. Before using abatacept, tell your doctor if you have ever had tuberculosis, if anyone in your household has tuberculosis, or if you have recently traveled to an area where tuberculosis is common. Tell your doctor if you have ever had:  · a weak immune system;  · any type of infection including a skin infection or open sores;  · infections that go away and come back;  · COPD (chronic obstructive pulmonary disease);  · diabetes;  · hepatitis; or  · if you are scheduled to receive any vaccines. Using abatacept may increase your risk of developing certain types of cancer such as lymphoma (cancer of the lymph nodes). This risk may be greater in older adults. Talk to your doctor about your specific risk. Tell your doctor if you are pregnant or breastfeeding. If you are pregnant, your name may be listed on a pregnancy registry to track the effects of abatacept on the baby. Children using abatacept should be current on all childhood immunizations before starting treatment.   How should I use abatacept? Before you start treatment with abatacept, your doctor may perform tests to make sure you do not have tuberculosis or other infections. Abatacept is injected under the skin, or as an infusion into a vein. A healthcare provider will give your first dose and may teach you how to properly use the medication by yourself. Abatacept is injected under the skin when given to a child between 3and 10years old. Abatacept must be given slowly when injected into a vein, and the IV infusion can take at least 30 minutes to complete. This medicine is usually given every 1 to 4 weeks. Follow your doctor's instructions. Abatacept must be mixed with a liquid (diluent) before using it. When using injections by yourself, be sure you understand how to properly mix and store the medicine. Read and carefully follow any Instructions for Use provided with your medicine. Ask your doctor or pharmacist if you don't understand all instructions. Prepare an injection only when you are ready to give it. Gently swirl but do not shake the medication bottle. Do not use if the medicine looks cloudy, has changed colors, or has particles in it. Call your pharmacist for new medicine. Each vial (bottle) or prefilled syringe is for one use only. Throw it away after one use, even if there is still medicine left inside. Use a needle and syringe only once and then place them in a puncture-proof \"sharps\" container. Follow state or local laws about how to dispose of this container. Keep it out of the reach of children and pets. If you need surgery, tell the surgeon ahead of time that you are using abatacept. If you've ever had hepatitis B, using abatacept can cause this virus to become active or get worse. You may need frequent liver function tests while using this medicine and for several months after you stop. Abatacept can cause false results with certain blood glucose tests, showing high blood sugar readings.  If you have diabetes, talk to your doctor about the best way to test your blood sugar. Autoimmune disorders are often treated with a combination of different drugs. Use all medications as directed and read all medication guides you receive. Do not change your dose or dosing schedule without your doctor's advice. Store abatacept in original carton in a refrigerator. Protect from light and do not freeze. Do not use after the expiration date on the medicine label has passed. If you need to travel with your medicine, place the syringes in a cooler with ice packs. Abatacept mixed with a diluent may be stored in a refrigerator or at room temperature and must be used within 24 hours. What happens if I miss a dose? Call your doctor for instructions if you miss your abatacept dose. What happens if I overdose? Seek emergency medical attention or call the Poison Help line at 1-970.194.6453. What should I avoid while using abatacept? Do not receive a \"live\" vaccine while using abatacept, and for at least 3 months after your treatment ends. The vaccine may not work as well during this time, and may not fully protect you from disease. Live vaccines include measles, mumps, rubella (MMR), rotavirus, typhoid, yellow fever, varicella (chickenpox), zoster (shingles), and nasal flu (influenza) vaccine. Avoid being near people who are sick or have infections. Tell your doctor at once if you develop signs of infection. What are the possible side effects of abatacept? Get emergency medical help if you have signs of an allergic reaction:  hives; difficulty breathing; swelling of your face, lips, tongue, or throat. Some side effects may occur during the injection. Tell your caregiver right away if you feel dizzy, light-headed, itchy, or have a severe headache or trouble breathing within 1 hour after receiving the injection. You may get infections more easily, even serious or fatal infections.  Call your doctor right away if you have signs of infection such as:  · fever, chills, night sweats, flu symptoms, weight loss;  · feeling very tired;  · dry cough, sore throat; or  · warmth, pain, or redness of your skin. Call your doctor at once if you have any of these other serious side effects:  · trouble breathing;  · stabbing chest pain, wheezing, cough with yellow or green mucus;  · pain or burning when you urinate; or  · signs of skin infection such as itching, swelling, warmth, redness, or oozing. Common side effects may include:  · fever;  · nausea, diarrhea, stomach pain;  · headache; or  · cold symptoms such as stuffy nose, sneezing, sore throat, cough. This is not a complete list of side effects and others may occur. Call your doctor for medical advice about side effects. You may report side effects to FDA at 1-911-FDA-8339. What other drugs will affect abatacept? Tell your doctor about all your other medicines, especially:  · adalimumab;  · anakinra;  · certolizumab;  · etanercept;  · golimumab;  · infliximab;  · rituximab; or  · tocilizumab. This list is not complete. Other drugs may affect abatacept, including prescription and over-the-counter medicines, vitamins, and herbal products. Not all possible drug interactions are listed here. Where can I get more information? Your doctor or pharmacist can provide more information about abatacept. Remember, keep this and all other medicines out of the reach of children, never share your medicines with others, and use this medication only for the indication prescribed. Every effort has been made to ensure that the information provided by [address] is accurate, up-to-date, and complete, but no guarantee is made to that effect. Drug information contained herein may be time sensitive.  [employer] information has been compiled for use by healthcare practitioners and consumers in the [facility] and therefore [employer] does not warrant that uses outside of the [facility] are appropriate, unless specifically indicated otherwise. [facility] drug information does not endorse drugs, diagnose patients or recommend therapy. [facility] drug information is an informational resource designed to assist licensed healthcare practitioners in caring for their patients and/or to serve consumers viewing this service as a supplement to, and not a substitute for, the expertise, skill, knowledge and judgment of healthcare practitioners. The absence of a warning for a given drug or drug combination in no way should be construed to indicate that the drug or drug combination is safe, effective or appropriate for any given patient. [facility] does not assume any responsibility for any aspect of healthcare administered with the aid of information [facility] provides. The information contained herein is not intended to cover all possible uses, directions, precautions, warnings, drug interactions, allergic reactions, or adverse effects. If you have questions about the drugs you are taking, check with your doctor, nurse or pharmacist.  Copyright 0250-6256 [address]. Version: 9.01. Revision date: 11/2/2020. Care instructions adapted under license by [facility] ([place] [facility]). If you have questions about a medical condition or this instruction, always ask your healthcare professional. [place] [address] any warranty or liability for your use of this information.

## 2021-09-24 ENCOUNTER — TELEPHONE (OUTPATIENT)
Dept: RHEUMATOLOGY | Age: 66
End: 2021-09-24

## 2021-09-24 RX ORDER — PREDNISONE 10 MG/1
TABLET ORAL
Qty: 15 TABLET | Refills: 0 | Status: SHIPPED | OUTPATIENT
Start: 2021-09-24 | End: 2021-12-03

## 2021-09-24 NOTE — TELEPHONE ENCOUNTER
Prednisone- start 2 tab daily, and a new script called in, to follow directions. Also discussed about IV therapy, she would benefit from it.

## 2021-09-24 NOTE — TELEPHONE ENCOUNTER
Pt called stating she is in severe pain, unable to sleep at night. Pt states left leg and hands are swollen and it \"hurts bad\". Pt uses [employer] and is requesting something to help. Pt can be reached at [phone]. Pt does have 9 tablets of 10 mg but would like to know how to begin dosing?

## 2021-12-03 RX ORDER — PREDNISONE 10 MG/1
TABLET ORAL
Qty: 15 TABLET | Refills: 0 | Status: SHIPPED | OUTPATIENT
Start: 2021-12-03

## 2021-12-07 ENCOUNTER — OFFICE VISIT (OUTPATIENT)
Dept: RHEUMATOLOGY | Age: 66
End: 2021-12-07
Payer: MEDICARE

## 2021-12-07 VITALS
HEIGHT: 66 IN | BODY MASS INDEX: 30.22 KG/M2 | WEIGHT: 188 LBS | SYSTOLIC BLOOD PRESSURE: 138 MMHG | DIASTOLIC BLOOD PRESSURE: 84 MMHG

## 2021-12-07 DIAGNOSIS — Z79.899 HIGH RISK MEDICATION USE: ICD-10-CM

## 2021-12-07 DIAGNOSIS — M05.9 SEROPOSITIVE RHEUMATOID ARTHRITIS (HCC): Primary | ICD-10-CM

## 2021-12-07 DIAGNOSIS — M81.0 SENILE OSTEOPOROSIS: ICD-10-CM

## 2021-12-07 PROCEDURE — 1123F ACP DISCUSS/DSCN MKR DOCD: CPT | Performed by: INTERNAL MEDICINE

## 2021-12-07 PROCEDURE — G8427 DOCREV CUR MEDS BY ELIG CLIN: HCPCS | Performed by: INTERNAL MEDICINE

## 2021-12-07 PROCEDURE — 99215 OFFICE O/P EST HI 40 MIN: CPT | Performed by: INTERNAL MEDICINE

## 2021-12-07 PROCEDURE — 1090F PRES/ABSN URINE INCON ASSESS: CPT | Performed by: INTERNAL MEDICINE

## 2021-12-07 PROCEDURE — G8484 FLU IMMUNIZE NO ADMIN: HCPCS | Performed by: INTERNAL MEDICINE

## 2021-12-07 PROCEDURE — G8417 CALC BMI ABV UP PARAM F/U: HCPCS | Performed by: INTERNAL MEDICINE

## 2021-12-07 PROCEDURE — 4040F PNEUMOC VAC/ADMIN/RCVD: CPT | Performed by: INTERNAL MEDICINE

## 2021-12-07 PROCEDURE — 3017F COLORECTAL CA SCREEN DOC REV: CPT | Performed by: INTERNAL MEDICINE

## 2021-12-07 PROCEDURE — G8400 PT W/DXA NO RESULTS DOC: HCPCS | Performed by: INTERNAL MEDICINE

## 2021-12-07 PROCEDURE — 1036F TOBACCO NON-USER: CPT | Performed by: INTERNAL MEDICINE

## 2021-12-07 RX ORDER — ACETAMINOPHEN 325 MG/1
650 TABLET ORAL
Status: CANCELLED | OUTPATIENT
Start: 2021-12-14

## 2021-12-07 RX ORDER — SODIUM CHLORIDE 9 MG/ML
INJECTION, SOLUTION INTRAVENOUS ONCE
Status: CANCELLED | OUTPATIENT
Start: 2021-12-14 | End: 2021-12-14

## 2021-12-07 RX ORDER — SODIUM CHLORIDE 9 MG/ML
INJECTION, SOLUTION INTRAVENOUS CONTINUOUS
Status: CANCELLED | OUTPATIENT
Start: 2021-12-14

## 2021-12-07 RX ORDER — EPINEPHRINE 1 MG/ML
0.3 INJECTION, SOLUTION, CONCENTRATE INTRAVENOUS PRN
Status: CANCELLED | OUTPATIENT
Start: 2021-12-14

## 2021-12-07 RX ORDER — HEPARIN SODIUM (PORCINE) LOCK FLUSH IV SOLN 100 UNIT/ML 100 UNIT/ML
500 SOLUTION INTRAVENOUS PRN
Status: CANCELLED | OUTPATIENT
Start: 2021-12-14

## 2021-12-07 RX ORDER — PREDNISONE 10 MG/1
TABLET ORAL
Qty: 90 TABLET | Refills: 0 | Status: SHIPPED | OUTPATIENT
Start: 2021-12-07 | End: 2022-09-16

## 2021-12-07 RX ORDER — SODIUM CHLORIDE 9 MG/ML
25 INJECTION, SOLUTION INTRAVENOUS PRN
Status: CANCELLED | OUTPATIENT
Start: 2021-12-14

## 2021-12-07 RX ORDER — ONDANSETRON 2 MG/ML
8 INJECTION INTRAMUSCULAR; INTRAVENOUS
Status: CANCELLED | OUTPATIENT
Start: 2021-12-14

## 2021-12-07 RX ORDER — DIPHENHYDRAMINE HYDROCHLORIDE 50 MG/ML
50 INJECTION INTRAMUSCULAR; INTRAVENOUS
Status: CANCELLED | OUTPATIENT
Start: 2021-12-14

## 2021-12-07 RX ORDER — SODIUM CHLORIDE 0.9 % (FLUSH) 0.9 %
5-40 SYRINGE (ML) INJECTION PRN
Status: CANCELLED | OUTPATIENT
Start: 2021-12-14

## 2021-12-07 NOTE — PROGRESS NOTES
[address], [place]                                                           [address] [place] [address]                                                             [phone] (P) [phone] (F)      Note is transcribed using voice recognition software. Inadvertent computerized transcription errors may be present. Patient identification: Mariana Mccann, female : 1955,66 y.o. Background information-  1. Seropositive rheumatoid arthritis (, )-onset   2. 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 Current therapy is not effective-methotrexate 20 mg a week and prednisone 10 mg a day. ADLs at times are affected by actively inflamed joints. PMH, PSH,Social history , Meds reviewed. FH-grandmother and aunt has rheumatoid arthritis. Current Outpatient Medications   Medication Sig Dispense Refill    methotrexate (RHEUMATREX) 2.5 MG chemo tablet Take 8 Tabs po once a week, same day every week,  split dose 6-8 hours apart on the same day. Safety alert: Labs as recommended by prescribing MD. 104 tablet 0    predniSONE (DELTASONE) 10 MG tablet Take 1 tab po daily. 90 tablet 0    predniSONE (DELTASONE) 10 MG tablet TAKE 2 TABLETS BY MOUTH DAILY FOR 4 DAYS, THEN 1 TABLET DAILY FOR 4 DAYS THEN 1/2 TABLET DAILY FOR 4 DAYS AND STOP. 15 tablet 0    methotrexate (RHEUMATREX) 2.5 MG chemo tablet Take 8 Tabs po once a week, same day every week,  split dose 6-8 hours apart on the same day 32 tablet 2    folic acid (FOLVITE) 1 MG tablet Take 1 tab po daily.  30 tablet 3    folic acid (FOLVITE) 1 MG tablet Take 1 tab po daily. 90 tablet 3    methotrexate (RHEUMATREX) 2.5 MG chemo tablet Take 8 tablets once a week on the same day. Divide the dose in the same day 4 in AM and 4 PM. 104 tablet 0    predniSONE (DELTASONE) 10 MG tablet Take 1 tab po daily. 30 tablet 1    lisinopril (PRINIVIL;ZESTRIL) 20 MG tablet Take 20 mg by mouth daily      atorvastatin (LIPITOR) 40 MG tablet Take 40 mg by mouth daily      predniSONE (DELTASONE) 10 MG tablet 2 tab po daily po daily. 60 tablet 0    furosemide (LASIX) 20 MG tablet Take 1 tablet by mouth daily for 5 days 5 tablet 0     No current facility-administered medications for this visit. PHYSICAL EXAM:    Vitals:    /84   Ht 5' 6\" (1.676 m)   Wt 188 lb (85.3 kg)   BMI 30.34 kg/m²   AA)x3 well nourished, and well groomed, normal judgement. MKS:   28 joint JOINT COUNT:                               Right                                                  Left   Swell Tender Swell Tender   PIP1           PIP2          PIP3 x x   x  x   PIP4  x  x  x   PIP5  x  x  x x   MCP1           MCP2  x  x   x   MCP3  xx x      MCP4    x       MCP5           Wrist   x  x  x   Elbow          Shoulder          Knee           Loose fist right hand. Both ankle joints and MTPs are tender to pressure. Skin: No rashes, no induration or skin thickening or nodules.         DATA:   Lab Results   Component Value Date    WBC 6.0 09/07/2021    HGB 14.2 09/07/2021    HCT 42.3 09/07/2021    .0 (H) 09/07/2021     09/07/2021     Lab Results   Component Value Date     09/07/2021    K 4.7 09/07/2021     09/07/2021    CO2 20 (L) 09/07/2021    BUN 15 09/07/2021    CREATININE 0.8 09/07/2021    GLUCOSE 80 09/07/2021    CALCIUM 9.7 09/07/2021    PROT 6.9 09/07/2021    LABALBU 4.3 09/07/2021    BILITOT 0.5 09/07/2021    ALKPHOS 67 09/07/2021    AST 27 09/07/2021    ALT 17 09/07/2021    LABGLOM >60 09/07/2021    GFRAA >60 09/07/2021    AGRATIO 1.7 09/07/2021    GLOB 2.6 09/07/2021       Lab Results   Component Value Date    SEDRATE 20 09/07/2021     Lab Results   Component Value Date    CRP 19.0 (H) 09/07/2021       ASSESSMENT AND PLAN:  Solo Kumari was seen today for follow-up and joint pain. Diagnoses and all orders for this visit:    Seropositive rheumatoid arthritis ([address]    High risk medication use  -     Comprehensive Metabolic Panel; Future  -     CBC Auto Differential; Future    Senile osteoporosis  -     DEXA BONE DENSITY 2 SITES; Future    Other orders  -     methotrexate (RHEUMATREX) 2.5 MG chemo tablet; Take 8 Tabs po once a week, same day every week,  split dose 6-8 hours apart on the same day. Safety alert: Labs as recommended by prescribing MD.  -     predniSONE (DELTASONE) 10 MG tablet; Take 1 tab po daily. Seropositive rheumatoid arthritis-active disease on 20 mg of methotrexate and prednisone. Plan-  Recommend biologic therapy, prefers IV. Therapy plan completed for Yuri Dumont at [facility]  Side effects, directions monitoring explained. Continue current dose of methotrexate and prednisone. Prescription refill. She is advised to call infusion center for scheduling. Safety lab will be checked at the time of infusion. Recommend booster dose of COVID-19 vaccine, and age-specific vaccinations. DEXA scan for bone health and prednisone use. All questions were answered. Patient verbalizes the understanding. Patient's niece helps with all her medical needs and treatments. All of the above was explained to her, showed understanding. 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    #################################################################################################  Patient indicates understanding and agrees with the management plan.   Total time >40 minutes that includes the following-  Preparing to see the patient such as reviewing patients records, pre-charting, preparing the visit on the same day, performing a medically appropriate history and physical examination, counseling and educating patient about diagnosis, management plan, ordering appropriate testings, prescriptions, communicating findings to other care providers, and documenting clinical information in electronic medical record. I thank you for giving me the opportunity to be involved in [facility] and I look forward following Gavi Stephen along with you. If you have any questions or concerns please feel free to contact me at any time.   Bri Murrieta MD 12/07/21

## 2021-12-21 ENCOUNTER — HOSPITAL ENCOUNTER (OUTPATIENT)
Dept: WOMENS IMAGING | Age: 66
Discharge: HOME | End: 2021-12-21
Payer: MEDICARE

## 2021-12-21 DIAGNOSIS — M81.0 SENILE OSTEOPOROSIS: ICD-10-CM

## 2021-12-21 PROCEDURE — 77080 DXA BONE DENSITY AXIAL: CPT

## 2022-03-24 RX ORDER — PREDNISONE 10 MG/1
TABLET ORAL
Qty: 90 TABLET | Refills: 0 | OUTPATIENT
Start: 2022-03-24

## 2022-03-29 RX ORDER — PREDNISONE 10 MG/1
TABLET ORAL
Qty: 15 TABLET | Refills: 0 | Status: SHIPPED | OUTPATIENT
Start: 2022-03-29 | End: 2022-09-16

## 2022-03-29 NOTE — TELEPHONE ENCOUNTER
Pt has appt on 4/6 next Wednesday and is out of prednisone. Rx for 15 pills was pended for approval. Pt stated labs will be done before appt.

## 2022-03-30 ENCOUNTER — HOSPITAL ENCOUNTER (OUTPATIENT)
Age: 67
Discharge: HOME | End: 2022-03-30
Payer: MEDICARE

## 2022-03-30 DIAGNOSIS — Z79.899 HIGH RISK MEDICATION USE: ICD-10-CM

## 2022-03-30 LAB
A/G RATIO: 2.8 (ref 1.1–2.2)
ALBUMIN SERPL-MCNC: 4.7 G/DL (ref 3.4–5)
ALP BLD-CCNC: 64 U/L (ref 40–129)
ALT SERPL-CCNC: 17 U/L (ref 10–40)
ANION GAP SERPL CALCULATED.3IONS-SCNC: 19 MMOL/L (ref 3–16)
AST SERPL-CCNC: 24 U/L (ref 15–37)
BASOPHILS ABSOLUTE: 0 K/UL (ref 0–0.2)
BASOPHILS RELATIVE PERCENT: 0.7 %
BILIRUB SERPL-MCNC: 0.6 MG/DL (ref 0–1)
BUN BLDV-MCNC: 20 MG/DL (ref 7–20)
C-REACTIVE PROTEIN: <3 MG/L (ref 0–5.1)
CALCIUM SERPL-MCNC: 9.8 MG/DL (ref 8.3–10.6)
CHLORIDE BLD-SCNC: 106 MMOL/L (ref 99–110)
CO2: 19 MMOL/L (ref 21–32)
CREAT SERPL-MCNC: 1 MG/DL (ref 0.6–1.2)
EOSINOPHILS ABSOLUTE: 0.1 K/UL (ref 0–0.6)
EOSINOPHILS RELATIVE PERCENT: 1.5 %
GFR AFRICAN AMERICAN: >60
GFR NON-AFRICAN AMERICAN: 56
GLUCOSE BLD-MCNC: 85 MG/DL (ref 70–99)
HCT VFR BLD CALC: 42.4 % (ref 36–48)
HEMOGLOBIN: 13.8 G/DL (ref 12–16)
LYMPHOCYTES ABSOLUTE: 0.9 K/UL (ref 1–5.1)
LYMPHOCYTES RELATIVE PERCENT: 17.1 %
MCH RBC QN AUTO: 33.2 PG (ref 26–34)
MCHC RBC AUTO-ENTMCNC: 32.5 G/DL (ref 31–36)
MCV RBC AUTO: 102.2 FL (ref 80–100)
MONOCYTES ABSOLUTE: 0.5 K/UL (ref 0–1.3)
MONOCYTES RELATIVE PERCENT: 8.6 %
NEUTROPHILS ABSOLUTE: 3.9 K/UL (ref 1.7–7.7)
NEUTROPHILS RELATIVE PERCENT: 72.1 %
PDW BLD-RTO: 15.1 % (ref 12.4–15.4)
PLATELET # BLD: 204 K/UL (ref 135–450)
PMV BLD AUTO: 8.5 FL (ref 5–10.5)
POTASSIUM SERPL-SCNC: 4.4 MMOL/L (ref 3.5–5.1)
RBC # BLD: 4.15 M/UL (ref 4–5.2)
SEDIMENTATION RATE, ERYTHROCYTE: 14 MM/HR (ref 0–30)
SODIUM BLD-SCNC: 144 MMOL/L (ref 136–145)
TOTAL PROTEIN: 6.4 G/DL (ref 6.4–8.2)
WBC # BLD: 5.4 K/UL (ref 4–11)

## 2022-03-30 PROCEDURE — 85025 COMPLETE CBC W/AUTO DIFF WBC: CPT

## 2022-03-30 PROCEDURE — 80053 COMPREHEN METABOLIC PANEL: CPT

## 2022-03-30 PROCEDURE — 36415 COLL VENOUS BLD VENIPUNCTURE: CPT

## 2022-03-30 PROCEDURE — 86140 C-REACTIVE PROTEIN: CPT

## 2022-03-30 PROCEDURE — 85652 RBC SED RATE AUTOMATED: CPT

## 2022-04-06 ENCOUNTER — OFFICE VISIT (OUTPATIENT)
Dept: RHEUMATOLOGY | Age: 67
End: 2022-04-06
Payer: MEDICARE

## 2022-04-06 VITALS
BODY MASS INDEX: 30.22 KG/M2 | DIASTOLIC BLOOD PRESSURE: 78 MMHG | WEIGHT: 188 LBS | SYSTOLIC BLOOD PRESSURE: 124 MMHG | HEIGHT: 66 IN

## 2022-04-06 DIAGNOSIS — M05.9 SEROPOSITIVE RHEUMATOID ARTHRITIS (HCC): Primary | ICD-10-CM

## 2022-04-06 DIAGNOSIS — Z79.899 HIGH RISK MEDICATION USE: ICD-10-CM

## 2022-04-06 PROCEDURE — 99215 OFFICE O/P EST HI 40 MIN: CPT | Performed by: INTERNAL MEDICINE

## 2022-04-06 PROCEDURE — G8427 DOCREV CUR MEDS BY ELIG CLIN: HCPCS | Performed by: INTERNAL MEDICINE

## 2022-04-06 PROCEDURE — G8417 CALC BMI ABV UP PARAM F/U: HCPCS | Performed by: INTERNAL MEDICINE

## 2022-04-06 PROCEDURE — 4040F PNEUMOC VAC/ADMIN/RCVD: CPT | Performed by: INTERNAL MEDICINE

## 2022-04-06 PROCEDURE — 1123F ACP DISCUSS/DSCN MKR DOCD: CPT | Performed by: INTERNAL MEDICINE

## 2022-04-06 PROCEDURE — 3017F COLORECTAL CA SCREEN DOC REV: CPT | Performed by: INTERNAL MEDICINE

## 2022-04-06 PROCEDURE — 1036F TOBACCO NON-USER: CPT | Performed by: INTERNAL MEDICINE

## 2022-04-06 PROCEDURE — 1090F PRES/ABSN URINE INCON ASSESS: CPT | Performed by: INTERNAL MEDICINE

## 2022-04-06 PROCEDURE — G8399 PT W/DXA RESULTS DOCUMENT: HCPCS | Performed by: INTERNAL MEDICINE

## 2022-04-06 RX ORDER — LEVOTHYROXINE SODIUM 0.1 MG/1
1 TABLET ORAL DAILY
COMMUNITY
Start: 2022-04-05

## 2022-04-06 RX ORDER — PREDNISONE 10 MG/1
TABLET ORAL
Qty: 90 TABLET | Refills: 0 | Status: SHIPPED | OUTPATIENT
Start: 2022-04-06 | End: 2022-09-16

## 2022-04-06 NOTE — PROGRESS NOTES
[address], MD                                                           [address] [place] [address], [address] [place] [address] Ave                                                             [phone] (P) [phone] (F)      Note is transcribed using voice recognition software. Inadvertent computerized transcription errors may be present. Patient identification: Vicente Troy, female : ,75 y.o. Background information-  1. Seropositive rheumatoid arthritis (, )-onset   2. 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 Still needing 10 mg of prednisone, continues to take methotrexate 20 mg weekly. She has not started biologic therapy yet which was placed at the time of last visit for [facility].  Patient sister helps with her appointment, tells me that is not feasible to do infusions at [facility]  She prefers [facility]. ADLs at times are affected by actively inflamed joints. PMH, PSH,Social history , Meds reviewed. FH-grandmother and aunt has rheumatoid arthritis. Current Outpatient Medications   Medication Sig Dispense Refill    methotrexate (RHEUMATREX) 2.5 MG chemo tablet Take 8 tablets once a week on the same day. Divide the dose in the same day 4 in AM and 4 PM. 104 tablet 0    predniSONE (DELTASONE) 10 MG tablet Take 1 tab po daily po daily. 90 tablet 0    levothyroxine (SYNTHROID) 100 MCG tablet Take 1 tablet by mouth daily      predniSONE (DELTASONE) 10 MG tablet Take 1 tab po daily. 15 tablet 0    folic acid (FOLVITE) 1 MG tablet TAKE 1 TAB BY MOUHTH DAILY.  30 tablet 11    methotrexate (RHEUMATREX) 2.5 MG chemo tablet Take 8 Tabs po once a week, same day every week,  split dose 6-8 hours apart on the same day. Safety alert: Labs as recommended by prescribing MD. 104 tablet 0    predniSONE (DELTASONE) 10 MG tablet Take 1 tab po daily. 90 tablet 0    predniSONE (DELTASONE) 10 MG tablet TAKE 2 TABLETS BY MOUTH DAILY FOR 4 DAYS, THEN 1 TABLET DAILY FOR 4 DAYS THEN 1/2 TABLET DAILY FOR 4 DAYS AND STOP. 15 tablet 0    methotrexate (RHEUMATREX) 2.5 MG chemo tablet Take 8 Tabs po once a week, same day every week,  split dose 6-8 hours apart on the same day 32 tablet 2    folic acid (FOLVITE) 1 MG tablet Take 1 tab po daily. 90 tablet 3    lisinopril (PRINIVIL;ZESTRIL) 20 MG tablet Take 20 mg by mouth daily      atorvastatin (LIPITOR) 40 MG tablet Take 40 mg by mouth daily      furosemide (LASIX) 20 MG tablet Take 1 tablet by mouth daily for 5 days 5 tablet 0     No current facility-administered medications for this visit. PHYSICAL EXAM:    Vitals:    /78   Ht 5' 6\" (1.676 m)   Wt 188 lb (85.3 kg)   BMI 30.34 kg/m²   AA)x3 well nourished, and well groomed, normal judgement. MKS:   28 joint JOINT COUNT:                               Right                                                  Left   Swell Tender Swell Tender   PIP1           PIP2          PIP3 x x        PIP4  x       PIP5  x  x      MCP1           MCP2  x  x x  x   MCP3  x x x  x   MCP4           MCP5           Wrist   x  x  x   Elbow          Shoulder          Knee           Loose fist right hand. Both ankle joints and MTPs are tender to pressure. Musculoskeletal examination is relatively unchanged since last visit-as above. Skin: No rashes, no induration or skin thickening or nodules.         DATA:   Lab Results   Component Value Date    WBC 5.4 03/30/2022    HGB 13.8 03/30/2022    HCT 42.4 03/30/2022    .2 (H) 03/30/2022     03/30/2022     Lab Results   Component Value Date     03/30/2022    K 4.4 03/30/2022     03/30/2022    CO2 19 (L) 03/30/2022    BUN 20 03/30/2022    CREATININE 1.0 03/30/2022    GLUCOSE 85 03/30/2022    CALCIUM 9.8 03/30/2022    PROT 6.4 03/30/2022    LABALBU 4.7 03/30/2022    BILITOT 0.6 03/30/2022    ALKPHOS 64 03/30/2022    AST 24 03/30/2022    ALT 17 03/30/2022    LABGLOM 56 (A) 03/30/2022    GFRAA >60 03/30/2022    AGRATIO 2.8 (H) 03/30/2022    GLOB 2.6 09/07/2021       Lab Results   Component Value Date    SEDRATE 14 03/30/2022     Lab Results   Component Value Date    CRP <3.0 03/30/2022       ASSESSMENT AND PLAN:  Marc Abarca was seen today for follow-up. Diagnoses and all orders for this visit:    Seropositive rheumatoid arthritis ([facility] [address]    High risk medication use    Other orders  -     methotrexate (RHEUMATREX) 2.5 MG chemo tablet; Take 8 tablets once a week on the same day. Divide the dose in the same day 4 in AM and 4 PM.  -     predniSONE (DELTASONE) 10 MG tablet; Take 1 tab po daily po daily. Seropositive rheumatoid arthritis-active disease-on 20 mg of methotrexate and prednisone 10 mg a day. Plan-  Safety labs are normal to continue current dose of methotrexate. Stay on prednisone 10 mg a day for now. Reduce prednisone 5 mg daily 1 month after starting Biologic. Various options were discussed-avoid oral Carrington inhibitors-given the risk of thrombosis, as multiple family members have factor V Leiden-homozygous gene Although she is not tested and she has not had personal history of blood clot. She does not feel comfortable doing self injections-has cognitive decline, and sister helps with her appointment, lives 45 minutes away from her. Therefore, the best therapy would be either IV or even better  will be subcu Cimzia that way she does not have to most time in infusion center. [facility]cu [facility]. If Cimzia is not available-other option would be IV abatacept and Simponi Aria.      Negative QuantiFERON and hepatitis serologies. Continue rest of the RA medication at this time. Bone health-DEXA scan-12/2021-normal-continue calcium and vitamin D supplementation. All questions were answered. Patient verbalizes the understanding. Patient's sister helps with all her medical needs and treatments. All of the above was explained to her, showed understanding. 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    #################################################################################################  Patient indicates understanding and agrees with the management plan. Total time >40 minutes that includes the following-  Preparing to see the patient such as reviewing patients records, pre-charting, preparing the visit on the same day, performing a medically appropriate history and physical examination, counseling and educating patient about diagnosis, management plan, ordering appropriate testings, prescriptions, communicating findings to other care providers, and documenting clinical information in electronic medical record. I thank you for giving me the opportunity to be involved in [facility] and I look forward following Teresa Addison along with you. If you have any questions or concerns please feel free to contact me at any time.   Shannen Goldstein MD 04/06/22

## 2022-04-07 DIAGNOSIS — M06.9 RHEUMATOID ARTHRITIS, INVOLVING UNSPECIFIED SITE, UNSPECIFIED WHETHER RHEUMATOID FACTOR PRESENT (HCC): ICD-10-CM

## 2022-04-07 RX ORDER — SODIUM CHLORIDE 9 MG/ML
INJECTION, SOLUTION INTRAVENOUS CONTINUOUS
Status: CANCELLED | OUTPATIENT
Start: 2022-04-07

## 2022-04-21 ENCOUNTER — HOSPITAL ENCOUNTER (OUTPATIENT)
Dept: MAMMOGRAPHY | Age: 67
Discharge: HOME | End: 2022-04-26

## 2022-04-21 DIAGNOSIS — Z12.31 VISIT FOR SCREENING MAMMOGRAM: ICD-10-CM

## 2022-04-27 ENCOUNTER — HOSPITAL ENCOUNTER (OUTPATIENT)
Dept: NURSING | Age: 67
Setting detail: INFUSION SERIES
Discharge: HOME | End: 2022-04-27
Payer: MEDICARE

## 2022-04-27 VITALS
HEIGHT: 65 IN | HEART RATE: 84 BPM | OXYGEN SATURATION: 97 % | RESPIRATION RATE: 16 BRPM | TEMPERATURE: 96.7 F | BODY MASS INDEX: 31.49 KG/M2 | DIASTOLIC BLOOD PRESSURE: 56 MMHG | SYSTOLIC BLOOD PRESSURE: 139 MMHG | WEIGHT: 189 LBS

## 2022-04-27 DIAGNOSIS — M06.9 RHEUMATOID ARTHRITIS, INVOLVING UNSPECIFIED SITE, UNSPECIFIED WHETHER RHEUMATOID FACTOR PRESENT (HCC): Primary | ICD-10-CM

## 2022-04-27 PROCEDURE — 6360000002 HC RX W HCPCS: Performed by: INTERNAL MEDICINE

## 2022-04-27 PROCEDURE — 2580000003 HC RX 258: Performed by: INTERNAL MEDICINE

## 2022-04-27 PROCEDURE — 96365 THER/PROPH/DIAG IV INF INIT: CPT

## 2022-04-27 PROCEDURE — 99211 OFF/OP EST MAY X REQ PHY/QHP: CPT

## 2022-04-27 RX ORDER — SODIUM CHLORIDE 9 MG/ML
INJECTION, SOLUTION INTRAVENOUS CONTINUOUS
Status: DISCONTINUED | OUTPATIENT
Start: 2022-04-27 | End: 2022-04-28 | Stop reason: HOSPADM

## 2022-04-27 RX ORDER — SODIUM CHLORIDE 9 MG/ML
INJECTION, SOLUTION INTRAVENOUS CONTINUOUS
Status: CANCELLED | OUTPATIENT
Start: 2022-05-11

## 2022-04-27 RX ADMIN — SODIUM CHLORIDE: 9 INJECTION, SOLUTION INTRAVENOUS at 13:09

## 2022-04-27 RX ADMIN — SODIUM CHLORIDE 750 MG: 9 INJECTION, SOLUTION INTRAVENOUS at 13:11

## 2022-04-27 ASSESSMENT — PAIN - FUNCTIONAL ASSESSMENT: PAIN_FUNCTIONAL_ASSESSMENT: NONE - DENIES PAIN

## 2022-04-27 NOTE — PROGRESS NOTES
Pt tolerates infusion well. discharge instructions given and reviewed.  No s/s of reaction discharged to home in stable condition

## 2022-05-12 ENCOUNTER — HOSPITAL ENCOUNTER (OUTPATIENT)
Dept: NURSING | Age: 67
Setting detail: INFUSION SERIES
Discharge: HOME | End: 2022-05-12
Payer: MEDICARE

## 2022-05-12 VITALS
DIASTOLIC BLOOD PRESSURE: 59 MMHG | HEIGHT: 66 IN | RESPIRATION RATE: 16 BRPM | HEART RATE: 77 BPM | WEIGHT: 188 LBS | SYSTOLIC BLOOD PRESSURE: 139 MMHG | OXYGEN SATURATION: 99 % | BODY MASS INDEX: 30.22 KG/M2 | TEMPERATURE: 97.2 F

## 2022-05-12 DIAGNOSIS — M06.9 RHEUMATOID ARTHRITIS, INVOLVING UNSPECIFIED SITE, UNSPECIFIED WHETHER RHEUMATOID FACTOR PRESENT (HCC): Primary | ICD-10-CM

## 2022-05-12 PROCEDURE — 99211 OFF/OP EST MAY X REQ PHY/QHP: CPT

## 2022-05-12 PROCEDURE — 6360000002 HC RX W HCPCS: Performed by: INTERNAL MEDICINE

## 2022-05-12 PROCEDURE — 2580000003 HC RX 258: Performed by: INTERNAL MEDICINE

## 2022-05-12 PROCEDURE — 96365 THER/PROPH/DIAG IV INF INIT: CPT

## 2022-05-12 RX ORDER — SODIUM CHLORIDE 9 MG/ML
INJECTION, SOLUTION INTRAVENOUS CONTINUOUS
Status: CANCELLED | OUTPATIENT
Start: 2022-05-25

## 2022-05-12 RX ORDER — SODIUM CHLORIDE 9 MG/ML
INJECTION, SOLUTION INTRAVENOUS CONTINUOUS
Status: DISCONTINUED | OUTPATIENT
Start: 2022-05-12 | End: 2022-05-13 | Stop reason: HOSPADM

## 2022-05-12 RX ADMIN — SODIUM CHLORIDE 750 MG: 9 INJECTION, SOLUTION INTRAVENOUS at 13:02

## 2022-05-12 RX ADMIN — SODIUM CHLORIDE: 9 INJECTION, SOLUTION INTRAVENOUS at 12:39

## 2022-05-12 ASSESSMENT — PAIN - FUNCTIONAL ASSESSMENT: PAIN_FUNCTIONAL_ASSESSMENT: NONE - DENIES PAIN

## 2022-05-27 ENCOUNTER — HOSPITAL ENCOUNTER (OUTPATIENT)
Dept: NURSING | Age: 67
Setting detail: INFUSION SERIES
Discharge: HOME | End: 2022-05-27
Payer: MEDICARE

## 2022-05-27 VITALS
RESPIRATION RATE: 18 BRPM | DIASTOLIC BLOOD PRESSURE: 55 MMHG | HEIGHT: 66 IN | TEMPERATURE: 97.2 F | OXYGEN SATURATION: 97 % | SYSTOLIC BLOOD PRESSURE: 107 MMHG | WEIGHT: 188 LBS | BODY MASS INDEX: 30.22 KG/M2

## 2022-05-27 DIAGNOSIS — M06.9 RHEUMATOID ARTHRITIS, INVOLVING UNSPECIFIED SITE, UNSPECIFIED WHETHER RHEUMATOID FACTOR PRESENT (HCC): Primary | ICD-10-CM

## 2022-05-27 PROCEDURE — 99211 OFF/OP EST MAY X REQ PHY/QHP: CPT

## 2022-05-27 PROCEDURE — 2580000003 HC RX 258: Performed by: INTERNAL MEDICINE

## 2022-05-27 PROCEDURE — 6360000002 HC RX W HCPCS: Performed by: INTERNAL MEDICINE

## 2022-05-27 PROCEDURE — 96365 THER/PROPH/DIAG IV INF INIT: CPT

## 2022-05-27 RX ORDER — SODIUM CHLORIDE 9 MG/ML
INJECTION, SOLUTION INTRAVENOUS CONTINUOUS
Status: CANCELLED | OUTPATIENT
Start: 2022-07-06

## 2022-05-27 RX ORDER — SODIUM CHLORIDE 9 MG/ML
INJECTION, SOLUTION INTRAVENOUS CONTINUOUS
Status: DISCONTINUED | OUTPATIENT
Start: 2022-05-27 | End: 2022-05-28 | Stop reason: HOSPADM

## 2022-05-27 RX ADMIN — SODIUM CHLORIDE: 9 INJECTION, SOLUTION INTRAVENOUS at 13:20

## 2022-05-27 RX ADMIN — SODIUM CHLORIDE 750 MG: 9 INJECTION, SOLUTION INTRAVENOUS at 13:23

## 2022-05-27 ASSESSMENT — PAIN - FUNCTIONAL ASSESSMENT: PAIN_FUNCTIONAL_ASSESSMENT: NONE - DENIES PAIN

## 2022-06-24 ENCOUNTER — HOSPITAL ENCOUNTER (OUTPATIENT)
Dept: NURSING | Age: 67
Setting detail: INFUSION SERIES
Discharge: HOME | End: 2022-06-24
Payer: MEDICARE

## 2022-06-24 VITALS
RESPIRATION RATE: 16 BRPM | HEART RATE: 77 BPM | TEMPERATURE: 97.2 F | BODY MASS INDEX: 30.37 KG/M2 | DIASTOLIC BLOOD PRESSURE: 63 MMHG | HEIGHT: 66 IN | WEIGHT: 189 LBS | SYSTOLIC BLOOD PRESSURE: 155 MMHG | OXYGEN SATURATION: 99 %

## 2022-06-24 DIAGNOSIS — M06.9 RHEUMATOID ARTHRITIS, INVOLVING UNSPECIFIED SITE, UNSPECIFIED WHETHER RHEUMATOID FACTOR PRESENT (HCC): Primary | ICD-10-CM

## 2022-06-24 LAB
ALT SERPL-CCNC: 36 U/L (ref 10–40)
AST SERPL-CCNC: 79 U/L (ref 15–37)
BASOPHILS ABSOLUTE: 0 K/UL (ref 0–0.2)
BASOPHILS RELATIVE PERCENT: 0.4 %
C-REACTIVE PROTEIN: <3 MG/L (ref 0–5.1)
CREAT SERPL-MCNC: 0.9 MG/DL (ref 0.6–1.2)
EOSINOPHILS ABSOLUTE: 0 K/UL (ref 0–0.6)
EOSINOPHILS RELATIVE PERCENT: 0.4 %
GFR AFRICAN AMERICAN: >60
GFR NON-AFRICAN AMERICAN: >60
HCT VFR BLD CALC: 40.9 % (ref 36–48)
HEMOGLOBIN: 13.4 G/DL (ref 12–16)
LYMPHOCYTES ABSOLUTE: 0.5 K/UL (ref 1–5.1)
LYMPHOCYTES RELATIVE PERCENT: 5.1 %
MCH RBC QN AUTO: 33.1 PG (ref 26–34)
MCHC RBC AUTO-ENTMCNC: 32.8 G/DL (ref 31–36)
MCV RBC AUTO: 100.9 FL (ref 80–100)
MONOCYTES ABSOLUTE: 0.2 K/UL (ref 0–1.3)
MONOCYTES RELATIVE PERCENT: 2.7 %
NEUTROPHILS ABSOLUTE: 8.2 K/UL (ref 1.7–7.7)
NEUTROPHILS RELATIVE PERCENT: 91.4 %
PDW BLD-RTO: 15.5 % (ref 12.4–15.4)
PLATELET # BLD: 230 K/UL (ref 135–450)
PMV BLD AUTO: 9.3 FL (ref 5–10.5)
RBC # BLD: 4.05 M/UL (ref 4–5.2)
SEDIMENTATION RATE, ERYTHROCYTE: 6 MM/HR (ref 0–30)
WBC # BLD: 9 K/UL (ref 4–11)

## 2022-06-24 PROCEDURE — 85025 COMPLETE CBC W/AUTO DIFF WBC: CPT

## 2022-06-24 PROCEDURE — 84460 ALANINE AMINO (ALT) (SGPT): CPT

## 2022-06-24 PROCEDURE — 2580000003 HC RX 258: Performed by: INTERNAL MEDICINE

## 2022-06-24 PROCEDURE — 99211 OFF/OP EST MAY X REQ PHY/QHP: CPT

## 2022-06-24 PROCEDURE — 85652 RBC SED RATE AUTOMATED: CPT

## 2022-06-24 PROCEDURE — 86140 C-REACTIVE PROTEIN: CPT

## 2022-06-24 PROCEDURE — 96365 THER/PROPH/DIAG IV INF INIT: CPT

## 2022-06-24 PROCEDURE — 6360000002 HC RX W HCPCS: Performed by: INTERNAL MEDICINE

## 2022-06-24 PROCEDURE — 82565 ASSAY OF CREATININE: CPT

## 2022-06-24 PROCEDURE — 84450 TRANSFERASE (AST) (SGOT): CPT

## 2022-06-24 RX ORDER — SODIUM CHLORIDE 9 MG/ML
INJECTION, SOLUTION INTRAVENOUS CONTINUOUS
Status: DISCONTINUED | OUTPATIENT
Start: 2022-06-24 | End: 2022-06-25 | Stop reason: HOSPADM

## 2022-06-24 RX ORDER — SODIUM CHLORIDE 9 MG/ML
INJECTION, SOLUTION INTRAVENOUS CONTINUOUS
Status: CANCELLED | OUTPATIENT
Start: 2022-07-06

## 2022-06-24 RX ADMIN — SODIUM CHLORIDE: 9 INJECTION, SOLUTION INTRAVENOUS at 13:14

## 2022-06-24 RX ADMIN — SODIUM CHLORIDE 750 MG: 9 INJECTION, SOLUTION INTRAVENOUS at 13:17

## 2022-06-24 ASSESSMENT — PAIN - FUNCTIONAL ASSESSMENT: PAIN_FUNCTIONAL_ASSESSMENT: NONE - DENIES PAIN

## 2022-07-05 RX ORDER — METHOTREXATE 2.5 MG/1
TABLET ORAL
Qty: 8 TABLET | Refills: 0 | Status: CANCELLED | OUTPATIENT
Start: 2022-07-05

## 2022-07-12 ENCOUNTER — OFFICE VISIT (OUTPATIENT)
Dept: RHEUMATOLOGY | Age: 67
End: 2022-07-12
Payer: MEDICARE

## 2022-07-12 VITALS
WEIGHT: 189 LBS | BODY MASS INDEX: 30.37 KG/M2 | DIASTOLIC BLOOD PRESSURE: 76 MMHG | HEIGHT: 66 IN | SYSTOLIC BLOOD PRESSURE: 120 MMHG

## 2022-07-12 DIAGNOSIS — R79.89 ELEVATED LFTS: ICD-10-CM

## 2022-07-12 DIAGNOSIS — M05.9 SEROPOSITIVE RHEUMATOID ARTHRITIS (HCC): Primary | ICD-10-CM

## 2022-07-12 DIAGNOSIS — Z79.899 HIGH RISK MEDICATION USE: ICD-10-CM

## 2022-07-12 PROCEDURE — 1090F PRES/ABSN URINE INCON ASSESS: CPT | Performed by: INTERNAL MEDICINE

## 2022-07-12 PROCEDURE — 99214 OFFICE O/P EST MOD 30 MIN: CPT | Performed by: INTERNAL MEDICINE

## 2022-07-12 PROCEDURE — 1036F TOBACCO NON-USER: CPT | Performed by: INTERNAL MEDICINE

## 2022-07-12 PROCEDURE — G8427 DOCREV CUR MEDS BY ELIG CLIN: HCPCS | Performed by: INTERNAL MEDICINE

## 2022-07-12 PROCEDURE — G8417 CALC BMI ABV UP PARAM F/U: HCPCS | Performed by: INTERNAL MEDICINE

## 2022-07-12 PROCEDURE — 3017F COLORECTAL CA SCREEN DOC REV: CPT | Performed by: INTERNAL MEDICINE

## 2022-07-12 PROCEDURE — 1123F ACP DISCUSS/DSCN MKR DOCD: CPT | Performed by: INTERNAL MEDICINE

## 2022-07-12 PROCEDURE — G8399 PT W/DXA RESULTS DOCUMENT: HCPCS | Performed by: INTERNAL MEDICINE

## 2022-07-12 RX ORDER — PREDNISONE 1 MG/1
TABLET ORAL
Qty: 30 TABLET | Refills: 0 | Status: SHIPPED | OUTPATIENT
Start: 2022-07-12 | End: 2022-08-22

## 2022-07-12 NOTE — PROGRESS NOTES
[address], MD                                                           [address] [place] [address] [phone] (P) [phone] (F)      Note is transcribed using voice recognition software. Inadvertent computerized transcription errors may be present. Patient identification: Tyler Saldaña, female : [birth year],69 y.o. Background information-  1. Seropositive rheumatoid arthritis (, )-onset   2. 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 Doing better in terms of RA, still on prednisone 10 mg a day and methotrexate. No side effects. No overt flares. Normal ADLs and recreational activities. She gets infusion at [facility]. PMH, PSH,Social history , Meds reviewed. FH-grandmother and aunt has rheumatoid arthritis. Current Outpatient Medications   Medication Sig Dispense Refill    methotrexate (RHEUMATREX) 2.5 MG chemo tablet Take 6 tab po once a week 72 tablet 0    predniSONE (DELTASONE) 5 MG tablet Take 1 tab po daily. 30 tablet 0    methotrexate (RHEUMATREX) 2.5 MG chemo tablet Take 8 tablets once a week on the same day. Divide the dose in the same day 4 in AM and 4 PM.Take 8 tablets once a week on the same day. Divide the dose in the same day 4 in AM and 4 PM. 32 tablet 0    levothyroxine (SYNTHROID) 100 MCG tablet Take 1 tablet by mouth daily      predniSONE (DELTASONE) 10 MG tablet Take 1 tab po daily po daily. 90 tablet 0    predniSONE (DELTASONE) 10 MG tablet Take 1 tab po daily. 15 tablet 0    folic acid (FOLVITE) 1 MG tablet TAKE 1 TAB BY MOUHTH DAILY.  30 tablet 11    methotrexate (RHEUMATREX) 2.5 MG chemo tablet Take 8 Tabs po once a week, same day every week,  split dose 6-8 hours apart on the same day. Safety alert: Labs as recommended by prescribing MD. 104 tablet 0    predniSONE (DELTASONE) 10 MG tablet Take 1 tab po daily. 90 tablet 0    predniSONE (DELTASONE) 10 MG tablet TAKE 2 TABLETS BY MOUTH DAILY FOR 4 DAYS, THEN 1 TABLET DAILY FOR 4 DAYS THEN 1/2 TABLET DAILY FOR 4 DAYS AND STOP. 15 tablet 0    methotrexate (RHEUMATREX) 2.5 MG chemo tablet Take 8 Tabs po once a week, same day every week,  split dose 6-8 hours apart on the same day 32 tablet 2    folic acid (FOLVITE) 1 MG tablet Take 1 tab po daily. 90 tablet 3    lisinopril (PRINIVIL;ZESTRIL) 20 MG tablet Take 20 mg by mouth daily      atorvastatin (LIPITOR) 40 MG tablet Take 40 mg by mouth daily      furosemide (LASIX) 20 MG tablet Take 1 tablet by mouth daily for 5 days 5 tablet 0     No current facility-administered medications for this visit. PHYSICAL EXAM:    Vitals:    /76   Ht 5' 6\" (1.676 m)   Wt 189 lb (85.7 kg)   BMI 30.51 kg/m²   AA)x3 well nourished, and well groomed, normal judgement. MKS:   28 joint JOINT COUNT:                               Right                                                  Left   Swell Tender Swell Tender   PIP1           PIP2          PIP3         PIP4         PIP5          MCP1           MCP2        MCP3       MCP4           MCP5           Wrist        Elbow          Shoulder          Knee             Normal MK exam without any tender, swollen or inflamed joints. Full range of motion all peripheral joints. Skin: No rashes, no induration or skin thickening or nodules.         DATA:   Lab Results   Component Value Date    WBC 9.0 06/24/2022    HGB 13.4 06/24/2022    HCT 40.9 06/24/2022    .9 (H) 06/24/2022     06/24/2022     Lab Results   Component Value Date     03/30/2022    K 4.4 03/30/2022     03/30/2022    CO2 19 (L) 03/30/2022    BUN 20 03/30/2022    CREATININE 0.9 06/24/2022    GLUCOSE 85 03/30/2022    CALCIUM 9.8 03/30/2022    PROT 6.4 03/30/2022    LABALBU 4.7 03/30/2022    BILITOT 0.6 03/30/2022    ALKPHOS 64 03/30/2022    AST 79 (H) 06/24/2022    ALT 36 06/24/2022    LABGLOM >60 06/24/2022    GFRAA >60 06/24/2022    AGRATIO 2.8 (H) 03/30/2022    GLOB 2.6 09/07/2021       Lab Results   Component Value Date    SEDRATE 6 06/24/2022     Lab Results   Component Value Date    CRP <3.0 06/24/2022       ASSESSMENT AND PLAN:  Maria Isabel Argueta was seen today for follow-up. Diagnoses and all orders for this visit:    Seropositive rheumatoid arthritis ([facility] [address]    High risk medication use  -     Hepatic Function Panel; Future    Elevated LFTs    Other orders  -     methotrexate (RHEUMATREX) 2.5 MG chemo tablet; Take 6 tab po once a week  -     predniSONE (DELTASONE) 5 MG tablet; Take 1 tab po daily. Seropositive rheumatoid arthritis-doing better, continue IV abatacept, methotrexate 6 tablets once a week, reduce prednisone 5 mg a day and discontinue in 2 weeks. Recheck LFT with next infusion. Bone health-DEXA scan-12/2021-normal-continue calcium and vitamin D supplementation. All questions were answered. Patient verbalizes the understanding. #################################################################################################  Patient indicates understanding and agrees with the management plan. Total time 32 minutes that includes the following-  Preparing to see the patient such as reviewing patients records, pre-charting, preparing the visit on the same day, performing a medically appropriate history and physical examination, counseling and educating patient about diagnosis, management plan, ordering appropriate testings, prescriptions, communicating findings to other care providers, and documenting clinical information in electronic medical record.   I thank you for giving me the opportunity to be involved in [facility] care and I look forward following Scott Walton along with you. If you have any questions or concerns please feel free to contact me at any time.   Natali Faye MD 07/12/22

## 2022-07-22 ENCOUNTER — HOSPITAL ENCOUNTER (OUTPATIENT)
Dept: NURSING | Age: 67
Setting detail: INFUSION SERIES
Discharge: HOME | End: 2022-07-22
Payer: MEDICARE

## 2022-07-22 VITALS
OXYGEN SATURATION: 97 % | TEMPERATURE: 96.7 F | HEIGHT: 66 IN | WEIGHT: 189 LBS | RESPIRATION RATE: 16 BRPM | BODY MASS INDEX: 30.37 KG/M2 | DIASTOLIC BLOOD PRESSURE: 61 MMHG | SYSTOLIC BLOOD PRESSURE: 122 MMHG | HEART RATE: 85 BPM

## 2022-07-22 DIAGNOSIS — M06.9 RHEUMATOID ARTHRITIS, INVOLVING UNSPECIFIED SITE, UNSPECIFIED WHETHER RHEUMATOID FACTOR PRESENT (HCC): Primary | ICD-10-CM

## 2022-07-22 LAB
ALBUMIN SERPL-MCNC: 4.3 G/DL (ref 3.4–5)
ALP BLD-CCNC: 67 U/L (ref 40–129)
ALT SERPL-CCNC: 20 U/L (ref 10–40)
AST SERPL-CCNC: 26 U/L (ref 15–37)
BILIRUB SERPL-MCNC: 0.7 MG/DL (ref 0–1)
BILIRUBIN DIRECT: <0.2 MG/DL (ref 0–0.3)
BILIRUBIN, INDIRECT: NORMAL MG/DL (ref 0–1)
TOTAL PROTEIN: 6.6 G/DL (ref 6.4–8.2)

## 2022-07-22 PROCEDURE — 2580000003 HC RX 258: Performed by: INTERNAL MEDICINE

## 2022-07-22 PROCEDURE — 99211 OFF/OP EST MAY X REQ PHY/QHP: CPT

## 2022-07-22 PROCEDURE — 96365 THER/PROPH/DIAG IV INF INIT: CPT

## 2022-07-22 PROCEDURE — 80076 HEPATIC FUNCTION PANEL: CPT

## 2022-07-22 PROCEDURE — 6360000002 HC RX W HCPCS: Performed by: INTERNAL MEDICINE

## 2022-07-22 RX ORDER — SODIUM CHLORIDE 9 MG/ML
INJECTION, SOLUTION INTRAVENOUS CONTINUOUS
Status: CANCELLED | OUTPATIENT
Start: 2022-08-19

## 2022-07-22 RX ORDER — SODIUM CHLORIDE 9 MG/ML
INJECTION, SOLUTION INTRAVENOUS CONTINUOUS
Status: DISCONTINUED | OUTPATIENT
Start: 2022-07-22 | End: 2022-07-23 | Stop reason: HOSPADM

## 2022-07-22 RX ADMIN — SODIUM CHLORIDE 750 MG: 9 INJECTION, SOLUTION INTRAVENOUS at 13:09

## 2022-07-22 RX ADMIN — SODIUM CHLORIDE: 9 INJECTION, SOLUTION INTRAVENOUS at 13:06

## 2022-08-19 ENCOUNTER — HOSPITAL ENCOUNTER (OUTPATIENT)
Dept: NURSING | Age: 67
Setting detail: INFUSION SERIES
Discharge: HOME | End: 2022-08-19
Payer: MEDICARE

## 2022-08-19 VITALS
HEIGHT: 66 IN | WEIGHT: 189 LBS | TEMPERATURE: 97.7 F | HEART RATE: 85 BPM | SYSTOLIC BLOOD PRESSURE: 131 MMHG | RESPIRATION RATE: 16 BRPM | DIASTOLIC BLOOD PRESSURE: 59 MMHG | OXYGEN SATURATION: 98 % | BODY MASS INDEX: 30.37 KG/M2

## 2022-08-19 DIAGNOSIS — M06.9 RHEUMATOID ARTHRITIS, INVOLVING UNSPECIFIED SITE, UNSPECIFIED WHETHER RHEUMATOID FACTOR PRESENT (HCC): Primary | ICD-10-CM

## 2022-08-19 PROCEDURE — 96365 THER/PROPH/DIAG IV INF INIT: CPT

## 2022-08-19 PROCEDURE — 6360000002 HC RX W HCPCS: Performed by: INTERNAL MEDICINE

## 2022-08-19 PROCEDURE — 99211 OFF/OP EST MAY X REQ PHY/QHP: CPT

## 2022-08-19 PROCEDURE — 2580000003 HC RX 258: Performed by: INTERNAL MEDICINE

## 2022-08-19 RX ORDER — SODIUM CHLORIDE 9 MG/ML
INJECTION, SOLUTION INTRAVENOUS CONTINUOUS
Status: DISCONTINUED | OUTPATIENT
Start: 2022-08-19 | End: 2022-08-20 | Stop reason: HOSPADM

## 2022-08-19 RX ORDER — SODIUM CHLORIDE 9 MG/ML
INJECTION, SOLUTION INTRAVENOUS CONTINUOUS
Status: CANCELLED | OUTPATIENT
Start: 2022-09-16

## 2022-08-19 RX ADMIN — SODIUM CHLORIDE 750 MG: 9 INJECTION, SOLUTION INTRAVENOUS at 12:38

## 2022-08-19 RX ADMIN — SODIUM CHLORIDE: 9 INJECTION, SOLUTION INTRAVENOUS at 12:38

## 2022-08-19 ASSESSMENT — PAIN - FUNCTIONAL ASSESSMENT: PAIN_FUNCTIONAL_ASSESSMENT: NONE - DENIES PAIN

## 2022-08-22 RX ORDER — PREDNISONE 1 MG/1
TABLET ORAL
Qty: 30 TABLET | Refills: 0 | Status: SHIPPED | OUTPATIENT
Start: 2022-08-22 | End: 2022-09-21

## 2022-09-16 ENCOUNTER — HOSPITAL ENCOUNTER (OUTPATIENT)
Dept: NURSING | Age: 67
Setting detail: INFUSION SERIES
Discharge: HOME | End: 2022-09-16
Payer: MEDICARE

## 2022-09-16 VITALS
HEART RATE: 81 BPM | BODY MASS INDEX: 30.53 KG/M2 | SYSTOLIC BLOOD PRESSURE: 120 MMHG | WEIGHT: 190 LBS | HEIGHT: 66 IN | TEMPERATURE: 97.5 F | OXYGEN SATURATION: 97 % | RESPIRATION RATE: 16 BRPM | DIASTOLIC BLOOD PRESSURE: 68 MMHG

## 2022-09-16 DIAGNOSIS — M06.9 RHEUMATOID ARTHRITIS, INVOLVING UNSPECIFIED SITE, UNSPECIFIED WHETHER RHEUMATOID FACTOR PRESENT (HCC): Primary | ICD-10-CM

## 2022-09-16 LAB
ALT SERPL-CCNC: 19 U/L (ref 10–40)
AST SERPL-CCNC: 24 U/L (ref 15–37)
BASOPHILS ABSOLUTE: 0.1 K/UL (ref 0–0.2)
BASOPHILS RELATIVE PERCENT: 0.8 %
C-REACTIVE PROTEIN: 3.3 MG/L (ref 0–5.1)
CREAT SERPL-MCNC: 0.9 MG/DL (ref 0.6–1.2)
EOSINOPHILS ABSOLUTE: 0 K/UL (ref 0–0.6)
EOSINOPHILS RELATIVE PERCENT: 0.3 %
GFR AFRICAN AMERICAN: >60
GFR NON-AFRICAN AMERICAN: >60
HCT VFR BLD CALC: 44.5 % (ref 36–48)
HEMOGLOBIN: 14.5 G/DL (ref 12–16)
LYMPHOCYTES ABSOLUTE: 0.8 K/UL (ref 1–5.1)
LYMPHOCYTES RELATIVE PERCENT: 10.7 %
MCH RBC QN AUTO: 33.3 PG (ref 26–34)
MCHC RBC AUTO-ENTMCNC: 32.5 G/DL (ref 31–36)
MCV RBC AUTO: 102.4 FL (ref 80–100)
MONOCYTES ABSOLUTE: 0.2 K/UL (ref 0–1.3)
MONOCYTES RELATIVE PERCENT: 3.1 %
NEUTROPHILS ABSOLUTE: 6.2 K/UL (ref 1.7–7.7)
NEUTROPHILS RELATIVE PERCENT: 85.1 %
PDW BLD-RTO: 14.4 % (ref 12.4–15.4)
PLATELET # BLD: 246 K/UL (ref 135–450)
PMV BLD AUTO: 8.8 FL (ref 5–10.5)
RBC # BLD: 4.35 M/UL (ref 4–5.2)
SEDIMENTATION RATE, ERYTHROCYTE: 6 MM/HR (ref 0–30)
WBC # BLD: 7.3 K/UL (ref 4–11)

## 2022-09-16 PROCEDURE — 85652 RBC SED RATE AUTOMATED: CPT

## 2022-09-16 PROCEDURE — 84460 ALANINE AMINO (ALT) (SGPT): CPT

## 2022-09-16 PROCEDURE — 2580000003 HC RX 258: Performed by: INTERNAL MEDICINE

## 2022-09-16 PROCEDURE — 96365 THER/PROPH/DIAG IV INF INIT: CPT

## 2022-09-16 PROCEDURE — 99211 OFF/OP EST MAY X REQ PHY/QHP: CPT

## 2022-09-16 PROCEDURE — 84450 TRANSFERASE (AST) (SGOT): CPT

## 2022-09-16 PROCEDURE — 6360000002 HC RX W HCPCS: Performed by: INTERNAL MEDICINE

## 2022-09-16 PROCEDURE — 86140 C-REACTIVE PROTEIN: CPT

## 2022-09-16 PROCEDURE — 82565 ASSAY OF CREATININE: CPT

## 2022-09-16 PROCEDURE — 85025 COMPLETE CBC W/AUTO DIFF WBC: CPT

## 2022-09-16 RX ORDER — SODIUM CHLORIDE 9 MG/ML
INJECTION, SOLUTION INTRAVENOUS CONTINUOUS
Status: CANCELLED | OUTPATIENT
Start: 2022-10-14

## 2022-09-16 RX ORDER — SODIUM CHLORIDE 9 MG/ML
INJECTION, SOLUTION INTRAVENOUS CONTINUOUS
Status: DISCONTINUED | OUTPATIENT
Start: 2022-09-16 | End: 2022-09-17 | Stop reason: HOSPADM

## 2022-09-16 RX ADMIN — SODIUM CHLORIDE: 9 INJECTION, SOLUTION INTRAVENOUS at 13:03

## 2022-09-16 RX ADMIN — SODIUM CHLORIDE 750 MG: 9 INJECTION, SOLUTION INTRAVENOUS at 13:05

## 2022-09-16 ASSESSMENT — PAIN - FUNCTIONAL ASSESSMENT: PAIN_FUNCTIONAL_ASSESSMENT: 0-10

## 2022-09-21 RX ORDER — PREDNISONE 1 MG/1
TABLET ORAL
Qty: 30 TABLET | Refills: 0 | Status: SHIPPED | OUTPATIENT
Start: 2022-09-21 | End: 2022-10-10 | Stop reason: SDUPTHER

## 2022-10-09 NOTE — PROGRESS NOTES
[address], MD                                                           [facility], [address]                                                              (P) [phone] (F)      Note is transcribed using voice recognition software. Inadvertent computerized transcription errors may be present. Patient identification: Darnelle Hammans, female : ,44 y.o. Background information-  1. Seropositive rheumatoid arthritis (, )-onset   2. 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 Other than fatigue, she is asymptomatic. No flares. Continues to take methotrexate and 5 mg of prednisone daily. Normal ADLs and recreational activities. She gets infusion [facility]. PMH, PSH,Social history , Meds reviewed. FH-grandmother and aunt has rheumatoid arthritis. Current Outpatient Medications   Medication Sig Dispense Refill    predniSONE (DELTASONE) 2.5 MG tablet Take 1 tablet daily 30 tablet 0    methotrexate (RHEUMATREX) 2.5 MG chemo tablet Take 6 Tabs po once a week, same day every week,  split dose 6-8 hours apart on the same day 78 tablet 0    folic acid (FOLVITE) 1 MG tablet Take 1 tab po daily.  90 tablet 3    levothyroxine (SYNTHROID) 100 MCG tablet Take 1 tablet by mouth daily      lisinopril (PRINIVIL;ZESTRIL) 20 MG tablet Take 20 mg by mouth daily      atorvastatin (LIPITOR) 40 MG tablet Take 40 mg by mouth daily      predniSONE (DELTASONE) 10 MG tablet TAKE 2 TABLETS BY MOUTH DAILY FOR 4 DAYS, THEN 1 TABLET DAILY FOR 4 DAYS THEN 1/2 TABLET DAILY FOR 4 DAYS AND STOP. (Patient not taking: Reported on 10/10/2022) 15 tablet 0    furosemide (LASIX) 20 MG tablet Take 1 tablet by mouth daily for 5 days 5 tablet 0     No current facility-administered medications for this visit. PHYSICAL EXAM:    Vitals:    /78   Ht 5' 6\" (1.676 m)   Wt 190 lb (86.2 kg)   BMI 30.67 kg/m²   AA)x3 well nourished, and well groomed, normal judgement. MKS:   28 joint JOINT COUNT:                               Right                                                  Left   Swell Tender Swell Tender   PIP1           PIP2          PIP3         PIP4         PIP5          MCP1           MCP2        MCP3       MCP4           MCP5           Wrist        Elbow          Shoulder          Knee             Normal joint examination without any findings of inflammatory arthritis. Full range of motion all peripheral joints. Skin: No rashes, no induration or skin thickening or nodules. DATA:   Lab Results   Component Value Date    WBC 7.3 09/16/2022    HGB 14.5 09/16/2022    HCT 44.5 09/16/2022    .4 (H) 09/16/2022     09/16/2022     Lab Results   Component Value Date     03/30/2022    K 4.4 03/30/2022     03/30/2022    CO2 19 (L) 03/30/2022    BUN 20 03/30/2022    CREATININE 0.9 09/16/2022    GLUCOSE 85 03/30/2022    CALCIUM 9.8 03/30/2022    PROT 6.6 07/22/2022    LABALBU 4.3 07/22/2022    BILITOT 0.7 07/22/2022    ALKPHOS 67 07/22/2022    AST 24 09/16/2022    ALT 19 09/16/2022    LABGLOM >60 09/16/2022    GFRAA >60 09/16/2022    AGRATIO 2.8 (H) 03/30/2022    GLOB 2.6 09/07/2021       Lab Results   Component Value Date    SEDRATE 6 09/16/2022     Lab Results   Component Value Date    CRP 3.3 09/16/2022       ASSESSMENT AND PLAN:  Jeff Conner was seen today for follow-up. Diagnoses and all orders for this visit:    Seropositive rheumatoid arthritis ([facility] [address]    High risk medication use    Other orders  -     predniSONE (DELTASONE) 2.5 MG tablet; Take 1 tablet daily  -     methotrexate (RHEUMATREX) 2.5 MG chemo tablet;  Take 6 Tabs po once a week, same day every week,  split dose 6-8 hours apart on the same day  -     folic acid (FOLVITE) 1 MG tablet; Take 1 tab po daily. Seropositive rheumatoid arthritis-asymptomatic, continue IV abatacept, methotrexate 6 tablets once a week, reduce prednisone 2.5 mg a day for 30 days and discontinue. Safety labs are normal.    Fatigue-multifactorial-poor sleep, inactivity, RA-recommend daily exercises. 2.  Bone health-DEXA scan-12/2021-normal-continue calcium and vitamin D supplementation. All questions were answered. Patient verbalizes the understanding. #################################################################################################  Patient indicates understanding and agrees with the management plan. Total time 32 minutes that includes the following-  Preparing to see the patient such as reviewing patients records, pre-charting, preparing the visit on the same day, performing a medically appropriate history and physical examination, counseling and educating patient about diagnosis, management plan, ordering appropriate testings, prescriptions, communicating findings to other care providers, and documenting clinical information in electronic medical record. I thank you for giving me the opportunity to be involved in [facility] and I look forward following Georgiana Gonzalez along with you. If you have any questions or concerns please feel free to contact me at any time.   Patrica Homans, MD 10/10/22

## 2022-10-10 ENCOUNTER — OFFICE VISIT (OUTPATIENT)
Dept: RHEUMATOLOGY | Age: 67
End: 2022-10-10
Payer: MEDICARE

## 2022-10-10 VITALS
WEIGHT: 190 LBS | DIASTOLIC BLOOD PRESSURE: 78 MMHG | HEIGHT: 66 IN | SYSTOLIC BLOOD PRESSURE: 120 MMHG | BODY MASS INDEX: 30.53 KG/M2

## 2022-10-10 DIAGNOSIS — Z79.899 HIGH RISK MEDICATION USE: ICD-10-CM

## 2022-10-10 DIAGNOSIS — M05.9 SEROPOSITIVE RHEUMATOID ARTHRITIS (HCC): Primary | ICD-10-CM

## 2022-10-10 PROCEDURE — G8399 PT W/DXA RESULTS DOCUMENT: HCPCS | Performed by: INTERNAL MEDICINE

## 2022-10-10 PROCEDURE — 1036F TOBACCO NON-USER: CPT | Performed by: INTERNAL MEDICINE

## 2022-10-10 PROCEDURE — G8417 CALC BMI ABV UP PARAM F/U: HCPCS | Performed by: INTERNAL MEDICINE

## 2022-10-10 PROCEDURE — 99214 OFFICE O/P EST MOD 30 MIN: CPT | Performed by: INTERNAL MEDICINE

## 2022-10-10 PROCEDURE — 1090F PRES/ABSN URINE INCON ASSESS: CPT | Performed by: INTERNAL MEDICINE

## 2022-10-10 PROCEDURE — 3017F COLORECTAL CA SCREEN DOC REV: CPT | Performed by: INTERNAL MEDICINE

## 2022-10-10 PROCEDURE — G8427 DOCREV CUR MEDS BY ELIG CLIN: HCPCS | Performed by: INTERNAL MEDICINE

## 2022-10-10 PROCEDURE — 1123F ACP DISCUSS/DSCN MKR DOCD: CPT | Performed by: INTERNAL MEDICINE

## 2022-10-10 PROCEDURE — G8484 FLU IMMUNIZE NO ADMIN: HCPCS | Performed by: INTERNAL MEDICINE

## 2022-10-10 RX ORDER — FOLIC ACID 1 MG/1
TABLET ORAL
Qty: 90 TABLET | Refills: 3 | Status: SHIPPED | OUTPATIENT
Start: 2022-10-10

## 2022-10-10 RX ORDER — PREDNISONE 2.5 MG
TABLET ORAL
Qty: 30 TABLET | Refills: 0 | Status: SHIPPED | OUTPATIENT
Start: 2022-10-10

## 2022-10-14 ENCOUNTER — HOSPITAL ENCOUNTER (OUTPATIENT)
Dept: NURSING | Age: 67
Setting detail: INFUSION SERIES
Discharge: HOME | End: 2022-10-14
Payer: MEDICARE

## 2022-10-14 VITALS
HEIGHT: 66 IN | BODY MASS INDEX: 30.53 KG/M2 | SYSTOLIC BLOOD PRESSURE: 123 MMHG | HEART RATE: 77 BPM | OXYGEN SATURATION: 100 % | DIASTOLIC BLOOD PRESSURE: 57 MMHG | TEMPERATURE: 97.6 F | WEIGHT: 190 LBS | RESPIRATION RATE: 16 BRPM

## 2022-10-14 DIAGNOSIS — M06.9 RHEUMATOID ARTHRITIS, INVOLVING UNSPECIFIED SITE, UNSPECIFIED WHETHER RHEUMATOID FACTOR PRESENT (HCC): Primary | ICD-10-CM

## 2022-10-14 PROCEDURE — 96365 THER/PROPH/DIAG IV INF INIT: CPT

## 2022-10-14 PROCEDURE — 2580000003 HC RX 258: Performed by: INTERNAL MEDICINE

## 2022-10-14 PROCEDURE — 99211 OFF/OP EST MAY X REQ PHY/QHP: CPT

## 2022-10-14 PROCEDURE — 6360000002 HC RX W HCPCS: Performed by: INTERNAL MEDICINE

## 2022-10-14 RX ORDER — SODIUM CHLORIDE 9 MG/ML
INJECTION, SOLUTION INTRAVENOUS CONTINUOUS
OUTPATIENT
Start: 2022-11-11

## 2022-10-14 RX ORDER — SODIUM CHLORIDE 9 MG/ML
INJECTION, SOLUTION INTRAVENOUS CONTINUOUS
Status: DISCONTINUED | OUTPATIENT
Start: 2022-10-14 | End: 2022-10-15 | Stop reason: HOSPADM

## 2022-10-14 RX ADMIN — SODIUM CHLORIDE 750 MG: 9 INJECTION, SOLUTION INTRAVENOUS at 13:11

## 2022-10-14 RX ADMIN — SODIUM CHLORIDE: 9 INJECTION, SOLUTION INTRAVENOUS at 13:08

## 2022-10-14 ASSESSMENT — PAIN - FUNCTIONAL ASSESSMENT: PAIN_FUNCTIONAL_ASSESSMENT: 0-10

## 2022-10-14 NOTE — PROGRESS NOTES
----- Message from Saúl Yun MD sent at 10/23/2020  9:52 PM EDT -----  Please call the patient regarding her abnormal result. Please let her know that she has a little 0.4 gm abnormal IgG lambda protein. No need to worry about this at this time. I'll see her back in January as planned. Thanks, SOPHIA   Pt tolerates Orencia infusion well .  Discharged to home in stable condition

## 2022-11-11 ENCOUNTER — HOSPITAL ENCOUNTER (OUTPATIENT)
Dept: NURSING | Age: 67
Setting detail: INFUSION SERIES
Discharge: HOME | End: 2022-11-11
Payer: MEDICARE

## 2022-11-11 VITALS
OXYGEN SATURATION: 97 % | HEART RATE: 80 BPM | WEIGHT: 189 LBS | SYSTOLIC BLOOD PRESSURE: 133 MMHG | BODY MASS INDEX: 30.37 KG/M2 | HEIGHT: 66 IN | DIASTOLIC BLOOD PRESSURE: 69 MMHG | TEMPERATURE: 97.9 F | RESPIRATION RATE: 16 BRPM

## 2022-11-11 DIAGNOSIS — M06.9 RHEUMATOID ARTHRITIS, INVOLVING UNSPECIFIED SITE, UNSPECIFIED WHETHER RHEUMATOID FACTOR PRESENT (HCC): Primary | ICD-10-CM

## 2022-11-11 PROCEDURE — 6360000002 HC RX W HCPCS: Performed by: INTERNAL MEDICINE

## 2022-11-11 PROCEDURE — 96365 THER/PROPH/DIAG IV INF INIT: CPT

## 2022-11-11 PROCEDURE — 99211 OFF/OP EST MAY X REQ PHY/QHP: CPT

## 2022-11-11 PROCEDURE — 2580000003 HC RX 258: Performed by: INTERNAL MEDICINE

## 2022-11-11 RX ORDER — SODIUM CHLORIDE 9 MG/ML
INJECTION, SOLUTION INTRAVENOUS CONTINUOUS
Status: DISCONTINUED | OUTPATIENT
Start: 2022-11-11 | End: 2022-11-12 | Stop reason: HOSPADM

## 2022-11-11 RX ORDER — SODIUM CHLORIDE 9 MG/ML
INJECTION, SOLUTION INTRAVENOUS CONTINUOUS
OUTPATIENT
Start: 2022-12-09

## 2022-11-11 RX ADMIN — SODIUM CHLORIDE: 9 INJECTION, SOLUTION INTRAVENOUS at 13:14

## 2022-11-11 RX ADMIN — SODIUM CHLORIDE 750 MG: 9 INJECTION, SOLUTION INTRAVENOUS at 13:19

## 2022-11-11 ASSESSMENT — PAIN - FUNCTIONAL ASSESSMENT: PAIN_FUNCTIONAL_ASSESSMENT: NONE - DENIES PAIN

## 2022-11-11 NOTE — PROGRESS NOTES
Pt tolerates infusion well. Follow up appointment made for a month. Discharged to home in stable cndition

## 2022-12-08 RX ORDER — PREDNISONE 2.5 MG
TABLET ORAL
Qty: 30 TABLET | Refills: 0 | OUTPATIENT
Start: 2022-12-08

## 2022-12-09 ENCOUNTER — HOSPITAL ENCOUNTER (OUTPATIENT)
Dept: NURSING | Age: 67
Setting detail: INFUSION SERIES
Discharge: HOME | End: 2022-12-09
Payer: MEDICARE

## 2022-12-09 VITALS
HEIGHT: 66 IN | BODY MASS INDEX: 29.73 KG/M2 | RESPIRATION RATE: 18 BRPM | SYSTOLIC BLOOD PRESSURE: 114 MMHG | TEMPERATURE: 97.5 F | WEIGHT: 185 LBS | HEART RATE: 69 BPM | OXYGEN SATURATION: 99 % | DIASTOLIC BLOOD PRESSURE: 56 MMHG

## 2022-12-09 DIAGNOSIS — M06.9 RHEUMATOID ARTHRITIS, INVOLVING UNSPECIFIED SITE, UNSPECIFIED WHETHER RHEUMATOID FACTOR PRESENT (HCC): Primary | ICD-10-CM

## 2022-12-09 LAB
ALT SERPL-CCNC: 22 U/L (ref 10–40)
AST SERPL-CCNC: 24 U/L (ref 15–37)
BASOPHILS ABSOLUTE: 0.1 K/UL (ref 0–0.2)
BASOPHILS RELATIVE PERCENT: 1 %
C-REACTIVE PROTEIN: <3 MG/L (ref 0–5.1)
CREAT SERPL-MCNC: 0.7 MG/DL (ref 0.6–1.2)
EOSINOPHILS ABSOLUTE: 0 K/UL (ref 0–0.6)
EOSINOPHILS RELATIVE PERCENT: 0.2 %
GFR SERPL CREATININE-BSD FRML MDRD: >60 ML/MIN/{1.73_M2}
HCT VFR BLD CALC: 39.9 % (ref 36–48)
HEMOGLOBIN: 13.5 G/DL (ref 12–16)
LYMPHOCYTES ABSOLUTE: 0.7 K/UL (ref 1–5.1)
LYMPHOCYTES RELATIVE PERCENT: 11.9 %
MCH RBC QN AUTO: 33.4 PG (ref 26–34)
MCHC RBC AUTO-ENTMCNC: 33.9 G/DL (ref 31–36)
MCV RBC AUTO: 98.3 FL (ref 80–100)
MONOCYTES ABSOLUTE: 0.3 K/UL (ref 0–1.3)
MONOCYTES RELATIVE PERCENT: 4.9 %
NEUTROPHILS ABSOLUTE: 5 K/UL (ref 1.7–7.7)
NEUTROPHILS RELATIVE PERCENT: 82 %
PDW BLD-RTO: 14.5 % (ref 12.4–15.4)
PLATELET # BLD: 282 K/UL (ref 135–450)
PMV BLD AUTO: 8.1 FL (ref 5–10.5)
RBC # BLD: 4.05 M/UL (ref 4–5.2)
SEDIMENTATION RATE, ERYTHROCYTE: 19 MM/HR (ref 0–30)
WBC # BLD: 6.1 K/UL (ref 4–11)

## 2022-12-09 PROCEDURE — 84460 ALANINE AMINO (ALT) (SGPT): CPT

## 2022-12-09 PROCEDURE — 85025 COMPLETE CBC W/AUTO DIFF WBC: CPT

## 2022-12-09 PROCEDURE — 82565 ASSAY OF CREATININE: CPT

## 2022-12-09 PROCEDURE — 99211 OFF/OP EST MAY X REQ PHY/QHP: CPT

## 2022-12-09 PROCEDURE — 86140 C-REACTIVE PROTEIN: CPT

## 2022-12-09 PROCEDURE — 84450 TRANSFERASE (AST) (SGOT): CPT

## 2022-12-09 PROCEDURE — 6360000002 HC RX W HCPCS: Performed by: INTERNAL MEDICINE

## 2022-12-09 PROCEDURE — 96365 THER/PROPH/DIAG IV INF INIT: CPT

## 2022-12-09 PROCEDURE — 2580000003 HC RX 258: Performed by: INTERNAL MEDICINE

## 2022-12-09 PROCEDURE — 85652 RBC SED RATE AUTOMATED: CPT

## 2022-12-09 RX ORDER — SODIUM CHLORIDE 9 MG/ML
INJECTION, SOLUTION INTRAVENOUS CONTINUOUS
Status: DISCONTINUED | OUTPATIENT
Start: 2022-12-09 | End: 2022-12-10 | Stop reason: HOSPADM

## 2022-12-09 RX ORDER — SODIUM CHLORIDE 9 MG/ML
INJECTION, SOLUTION INTRAVENOUS CONTINUOUS
OUTPATIENT
Start: 2023-01-06

## 2022-12-09 RX ADMIN — SODIUM CHLORIDE 750 MG: 9 INJECTION, SOLUTION INTRAVENOUS at 13:26

## 2022-12-09 ASSESSMENT — PAIN - FUNCTIONAL ASSESSMENT: PAIN_FUNCTIONAL_ASSESSMENT: 0-10

## 2022-12-09 NOTE — PROGRESS NOTES
Labs drawn. Pt tolerates infusion well. Scheduled follow up visit. Denies need for education.  Discharged to home in stable condition

## 2022-12-12 RX ORDER — PREDNISONE 2.5 MG
TABLET ORAL
Qty: 30 TABLET | Refills: 0 | OUTPATIENT
Start: 2022-12-12

## 2022-12-14 RX ORDER — PREDNISONE 2.5 MG
TABLET ORAL
Qty: 30 TABLET | Refills: 0 | Status: SHIPPED | OUTPATIENT
Start: 2022-12-14

## 2022-12-14 NOTE — TELEPHONE ENCOUNTER
Last OV 10/10/22  Future OV 1/9/23  Recent Labs 12/9/22       Patient is requesting medication request  .medication

## 2023-01-06 ENCOUNTER — HOSPITAL ENCOUNTER (OUTPATIENT)
Dept: NURSING | Age: 68
Setting detail: INFUSION SERIES
Discharge: HOME | End: 2023-01-06
Payer: MEDICARE

## 2023-01-06 VITALS
HEART RATE: 76 BPM | SYSTOLIC BLOOD PRESSURE: 133 MMHG | RESPIRATION RATE: 20 BRPM | OXYGEN SATURATION: 100 % | BODY MASS INDEX: 28.28 KG/M2 | HEIGHT: 66 IN | DIASTOLIC BLOOD PRESSURE: 73 MMHG | TEMPERATURE: 96.3 F | WEIGHT: 176 LBS

## 2023-01-06 DIAGNOSIS — M06.9 RHEUMATOID ARTHRITIS, INVOLVING UNSPECIFIED SITE, UNSPECIFIED WHETHER RHEUMATOID FACTOR PRESENT (HCC): Primary | ICD-10-CM

## 2023-01-06 PROCEDURE — 6360000002 HC RX W HCPCS: Performed by: INTERNAL MEDICINE

## 2023-01-06 PROCEDURE — 2580000003 HC RX 258: Performed by: INTERNAL MEDICINE

## 2023-01-06 PROCEDURE — 96365 THER/PROPH/DIAG IV INF INIT: CPT

## 2023-01-06 PROCEDURE — 99211 OFF/OP EST MAY X REQ PHY/QHP: CPT

## 2023-01-06 RX ORDER — SODIUM CHLORIDE 9 MG/ML
INJECTION, SOLUTION INTRAVENOUS CONTINUOUS
OUTPATIENT
Start: 2023-02-03

## 2023-01-06 RX ORDER — SODIUM CHLORIDE 9 MG/ML
INJECTION, SOLUTION INTRAVENOUS CONTINUOUS
Status: DISCONTINUED | OUTPATIENT
Start: 2023-01-06 | End: 2023-01-07 | Stop reason: HOSPADM

## 2023-01-06 RX ADMIN — SODIUM CHLORIDE: 9 INJECTION, SOLUTION INTRAVENOUS at 13:48

## 2023-01-06 RX ADMIN — SODIUM CHLORIDE 750 MG: 9 INJECTION, SOLUTION INTRAVENOUS at 13:47

## 2023-01-06 ASSESSMENT — PAIN - FUNCTIONAL ASSESSMENT: PAIN_FUNCTIONAL_ASSESSMENT: NONE - DENIES PAIN

## 2023-01-06 NOTE — PROGRESS NOTES
Pt tolerated Orencia infusion well with no noted side effects. IV removed and pressure held at site. DSD applied. Discharged ambulatory in baseline good condition.

## 2023-01-09 ENCOUNTER — OFFICE VISIT (OUTPATIENT)
Dept: RHEUMATOLOGY | Age: 68
End: 2023-01-09
Payer: MEDICARE

## 2023-01-09 VITALS
WEIGHT: 176 LBS | BODY MASS INDEX: 28.28 KG/M2 | SYSTOLIC BLOOD PRESSURE: 120 MMHG | DIASTOLIC BLOOD PRESSURE: 76 MMHG | HEIGHT: 66 IN

## 2023-01-09 DIAGNOSIS — Z79.899 HIGH RISK MEDICATION USE: ICD-10-CM

## 2023-01-09 DIAGNOSIS — M05.9 SEROPOSITIVE RHEUMATOID ARTHRITIS (HCC): Primary | ICD-10-CM

## 2023-01-09 PROCEDURE — 1123F ACP DISCUSS/DSCN MKR DOCD: CPT | Performed by: INTERNAL MEDICINE

## 2023-01-09 PROCEDURE — G8427 DOCREV CUR MEDS BY ELIG CLIN: HCPCS | Performed by: INTERNAL MEDICINE

## 2023-01-09 PROCEDURE — G8399 PT W/DXA RESULTS DOCUMENT: HCPCS | Performed by: INTERNAL MEDICINE

## 2023-01-09 PROCEDURE — 1036F TOBACCO NON-USER: CPT | Performed by: INTERNAL MEDICINE

## 2023-01-09 PROCEDURE — G8484 FLU IMMUNIZE NO ADMIN: HCPCS | Performed by: INTERNAL MEDICINE

## 2023-01-09 PROCEDURE — G8417 CALC BMI ABV UP PARAM F/U: HCPCS | Performed by: INTERNAL MEDICINE

## 2023-01-09 PROCEDURE — 1090F PRES/ABSN URINE INCON ASSESS: CPT | Performed by: INTERNAL MEDICINE

## 2023-01-09 PROCEDURE — 99214 OFFICE O/P EST MOD 30 MIN: CPT | Performed by: INTERNAL MEDICINE

## 2023-01-09 PROCEDURE — 3017F COLORECTAL CA SCREEN DOC REV: CPT | Performed by: INTERNAL MEDICINE

## 2023-01-09 RX ORDER — PREDNISONE 2.5 MG
TABLET ORAL
Qty: 90 TABLET | Refills: 0 | Status: SHIPPED | OUTPATIENT
Start: 2023-01-09

## 2023-01-09 RX ORDER — FOLIC ACID 1 MG/1
TABLET ORAL
Qty: 90 TABLET | Refills: 3 | Status: SHIPPED | OUTPATIENT
Start: 2023-01-09

## 2023-01-09 NOTE — PROGRESS NOTES
[address], [place]                                                           [address] [place] [address] [phone] (P) [phone] (F)      Note is transcribed using voice recognition software. Inadvertent computerized transcription errors may be present. Patient identification: Erika Hernandez, female : ,86 y.o. Background information-  1. Seropositive rheumatoid arthritis (, )-onset   2. Childhood history of rubeola-caused hearing loss and mental impairment      Subjective-  She is asymptomatic in terms of RA after she started IV abatacept in 2022. Off of prednisone, continues to take methotrexate 6 tablets weekly. Normal ADLs and recreational activities. She gets infusion at [facility]. PMH, PSH,Social history , Meds reviewed. FH-grandmother and aunt has rheumatoid arthritis. Current Outpatient Medications   Medication Sig Dispense Refill    methotrexate (RHEUMATREX) 2.5 MG chemo tablet Take 6 Tabs po once a week, same day every week,  split dose 6-8 hours apart on the same day 78 tablet 0    folic acid (FOLVITE) 1 MG tablet Take 1 tab po daily. 90 tablet 3    predniSONE (DELTASONE) 2.5 MG tablet Take   tab daily 90 tablet 0    predniSONE (DELTASONE) 2.5 MG tablet Take 1 tablet daily 30 tablet 0    levothyroxine (SYNTHROID) 100 MCG tablet Take 1 tablet by mouth daily      lisinopril (PRINIVIL;ZESTRIL) 20 MG tablet Take 20 mg by mouth daily      atorvastatin (LIPITOR) 40 MG tablet Take 40 mg by mouth daily      furosemide (LASIX) 20 MG tablet Take 1 tablet by mouth daily for 5 days 5 tablet 0     No current facility-administered medications for this visit. PHYSICAL EXAM:    Vitals:    /76   Ht 5' 6\" (1.676 m)   Wt 176 lb (79.8 kg)   BMI 28.41 kg/m²   AA)x3 well nourished, and well groomed, normal judgement. MKS:   28 joint JOINT COUNT:                               Right                                                  Left   Swell Tender Swell Tender   PIP1           PIP2          PIP3         PIP4         PIP5          MCP1           MCP2        MCP3       MCP4           MCP5           Wrist        Elbow          Shoulder          Knee           Exam unchanged since last visit-  Normal joint examination without any findings of inflammatory arthritis. Full range of motion all peripheral joints. Skin: No rashes, no induration or skin thickening or nodules. DATA:   Lab Results   Component Value Date    WBC 6.1 12/09/2022    HGB 13.5 12/09/2022    HCT 39.9 12/09/2022    MCV 98.3 12/09/2022     12/09/2022     Lab Results   Component Value Date     03/30/2022    K 4.4 03/30/2022     03/30/2022    CO2 19 (L) 03/30/2022    BUN 20 03/30/2022    CREATININE 0.7 12/09/2022    GLUCOSE 85 03/30/2022    CALCIUM 9.8 03/30/2022    PROT 6.6 07/22/2022    LABALBU 4.3 07/22/2022    BILITOT 0.7 07/22/2022    ALKPHOS 67 07/22/2022    AST 24 12/09/2022    ALT 22 12/09/2022    LABGLOM >60 12/09/2022    GFRAA >60 09/16/2022    AGRATIO 2.8 (H) 03/30/2022    GLOB 2.6 09/07/2021       Lab Results   Component Value Date    SEDRATE 19 12/09/2022     Lab Results   Component Value Date    CRP <3.0 12/09/2022       ASSESSMENT AND PLAN:  Kinza Parada was seen today for follow-up. Diagnoses and all orders for this visit:    Seropositive rheumatoid arthritis ([facility] [address]    High risk medication use    Other orders  -     methotrexate (RHEUMATREX) 2.5 MG chemo tablet; Take 6 Tabs po once a week, same day every week,  split dose 6-8 hours apart on the same day  -     folic acid (FOLVITE) 1 MG tablet; Take 1 tab po daily.   -     predniSONE (DELTASONE) 2.5 MG tablet; Take   tab daily      Seropositive rheumatoid arthritis-in remission,, continue IV abatacept, methotrexate 6 tablets once a week. Safety labs are normal.    Fatigue-multifactorial-poor sleep, inactivity, RA-recommend daily exercises. 2.  Bone health-DEXA scan-12/2021-normal-continue calcium and vitamin D supplementation. All questions were answered. Patient verbalizes the understanding. #################################################################################################  Patient indicates understanding and agrees with the management plan. Total time 32 minutes that includes the following-  Preparing to see the patient such as reviewing patients records, pre-charting, preparing the visit on the same day, performing a medically appropriate history and physical examination, counseling and educating patient about diagnosis, management plan, ordering appropriate testings, prescriptions, communicating findings to other care providers, and documenting clinical information in electronic medical record. I thank you for giving me the opportunity to be involved in [facility] and I look forward following Ijeoma Rucker along with you. If you have any questions or concerns please feel free to contact me at any time.   Lanie Marinelli MD 01/09/23

## 2023-02-03 ENCOUNTER — HOSPITAL ENCOUNTER (OUTPATIENT)
Dept: NURSING | Age: 68
Setting detail: INFUSION SERIES
Discharge: HOME | End: 2023-02-03
Payer: MEDICARE

## 2023-02-03 VITALS
OXYGEN SATURATION: 99 % | WEIGHT: 180 LBS | BODY MASS INDEX: 28.93 KG/M2 | RESPIRATION RATE: 16 BRPM | DIASTOLIC BLOOD PRESSURE: 51 MMHG | HEIGHT: 66 IN | TEMPERATURE: 97.4 F | SYSTOLIC BLOOD PRESSURE: 119 MMHG | HEART RATE: 73 BPM

## 2023-02-03 DIAGNOSIS — M06.9 RHEUMATOID ARTHRITIS, INVOLVING UNSPECIFIED SITE, UNSPECIFIED WHETHER RHEUMATOID FACTOR PRESENT (HCC): Primary | ICD-10-CM

## 2023-02-03 PROCEDURE — 99211 OFF/OP EST MAY X REQ PHY/QHP: CPT

## 2023-02-03 PROCEDURE — 2580000003 HC RX 258: Performed by: INTERNAL MEDICINE

## 2023-02-03 PROCEDURE — 96365 THER/PROPH/DIAG IV INF INIT: CPT

## 2023-02-03 PROCEDURE — 6360000002 HC RX W HCPCS: Performed by: INTERNAL MEDICINE

## 2023-02-03 RX ORDER — SODIUM CHLORIDE 9 MG/ML
INJECTION, SOLUTION INTRAVENOUS CONTINUOUS
Status: DISCONTINUED | OUTPATIENT
Start: 2023-02-03 | End: 2023-02-04 | Stop reason: HOSPADM

## 2023-02-03 RX ORDER — SODIUM CHLORIDE 9 MG/ML
INJECTION, SOLUTION INTRAVENOUS CONTINUOUS
OUTPATIENT
Start: 2023-03-03

## 2023-02-03 RX ADMIN — SODIUM CHLORIDE 750 MG: 9 INJECTION, SOLUTION INTRAVENOUS at 12:28

## 2023-02-03 RX ADMIN — SODIUM CHLORIDE: 9 INJECTION, SOLUTION INTRAVENOUS at 12:19

## 2023-02-03 ASSESSMENT — PAIN - FUNCTIONAL ASSESSMENT: PAIN_FUNCTIONAL_ASSESSMENT: NONE - DENIES PAIN

## 2023-03-03 ENCOUNTER — HOSPITAL ENCOUNTER (OUTPATIENT)
Dept: NURSING | Age: 68
Setting detail: INFUSION SERIES
Discharge: HOME | End: 2023-03-03
Payer: MEDICARE

## 2023-03-03 VITALS
SYSTOLIC BLOOD PRESSURE: 134 MMHG | TEMPERATURE: 97.1 F | HEART RATE: 77 BPM | WEIGHT: 182 LBS | HEIGHT: 66 IN | BODY MASS INDEX: 29.25 KG/M2 | OXYGEN SATURATION: 98 % | DIASTOLIC BLOOD PRESSURE: 70 MMHG | RESPIRATION RATE: 16 BRPM

## 2023-03-03 DIAGNOSIS — M06.9 RHEUMATOID ARTHRITIS, INVOLVING UNSPECIFIED SITE, UNSPECIFIED WHETHER RHEUMATOID FACTOR PRESENT (HCC): Primary | ICD-10-CM

## 2023-03-03 LAB
ALT SERPL-CCNC: 18 U/L (ref 10–40)
AST SERPL-CCNC: 23 U/L (ref 15–37)
BASOPHILS ABSOLUTE: 0 K/UL (ref 0–0.2)
BASOPHILS RELATIVE PERCENT: 0.5 %
C-REACTIVE PROTEIN: 12.2 MG/L (ref 0–5.1)
CREAT SERPL-MCNC: 0.8 MG/DL (ref 0.6–1.2)
EOSINOPHILS ABSOLUTE: 0 K/UL (ref 0–0.6)
EOSINOPHILS RELATIVE PERCENT: 0.6 %
GFR SERPL CREATININE-BSD FRML MDRD: >60 ML/MIN/{1.73_M2}
HCT VFR BLD CALC: 41.5 % (ref 36–48)
HEMOGLOBIN: 13.8 G/DL (ref 12–16)
LYMPHOCYTES ABSOLUTE: 0.6 K/UL (ref 1–5.1)
LYMPHOCYTES RELATIVE PERCENT: 10.4 %
MCH RBC QN AUTO: 33.9 PG (ref 26–34)
MCHC RBC AUTO-ENTMCNC: 33.2 G/DL (ref 31–36)
MCV RBC AUTO: 101.9 FL (ref 80–100)
MONOCYTES ABSOLUTE: 0.2 K/UL (ref 0–1.3)
MONOCYTES RELATIVE PERCENT: 4.1 %
NEUTROPHILS ABSOLUTE: 4.8 K/UL (ref 1.7–7.7)
NEUTROPHILS RELATIVE PERCENT: 84.4 %
PDW BLD-RTO: 14.3 % (ref 12.4–15.4)
PLATELET # BLD: 250 K/UL (ref 135–450)
PMV BLD AUTO: 8.1 FL (ref 5–10.5)
RBC # BLD: 4.07 M/UL (ref 4–5.2)
SEDIMENTATION RATE, ERYTHROCYTE: 18 MM/HR (ref 0–30)
WBC # BLD: 5.7 K/UL (ref 4–11)

## 2023-03-03 PROCEDURE — 84460 ALANINE AMINO (ALT) (SGPT): CPT

## 2023-03-03 PROCEDURE — 85025 COMPLETE CBC W/AUTO DIFF WBC: CPT

## 2023-03-03 PROCEDURE — 82565 ASSAY OF CREATININE: CPT

## 2023-03-03 PROCEDURE — 6360000002 HC RX W HCPCS: Performed by: INTERNAL MEDICINE

## 2023-03-03 PROCEDURE — 96365 THER/PROPH/DIAG IV INF INIT: CPT

## 2023-03-03 PROCEDURE — 86140 C-REACTIVE PROTEIN: CPT

## 2023-03-03 PROCEDURE — 99211 OFF/OP EST MAY X REQ PHY/QHP: CPT

## 2023-03-03 PROCEDURE — 85652 RBC SED RATE AUTOMATED: CPT

## 2023-03-03 PROCEDURE — 84450 TRANSFERASE (AST) (SGOT): CPT

## 2023-03-03 PROCEDURE — 2580000003 HC RX 258: Performed by: INTERNAL MEDICINE

## 2023-03-03 RX ORDER — SODIUM CHLORIDE 9 MG/ML
INJECTION, SOLUTION INTRAVENOUS CONTINUOUS
Status: DISCONTINUED | OUTPATIENT
Start: 2023-03-03 | End: 2023-03-04 | Stop reason: HOSPADM

## 2023-03-03 RX ORDER — SODIUM CHLORIDE 9 MG/ML
INJECTION, SOLUTION INTRAVENOUS CONTINUOUS
OUTPATIENT
Start: 2023-03-31

## 2023-03-03 RX ADMIN — SODIUM CHLORIDE: 9 INJECTION, SOLUTION INTRAVENOUS at 13:22

## 2023-03-03 RX ADMIN — SODIUM CHLORIDE 750 MG: 9 INJECTION, SOLUTION INTRAVENOUS at 13:33

## 2023-03-03 ASSESSMENT — PAIN DESCRIPTION - DESCRIPTORS: DESCRIPTORS: ACHING

## 2023-03-03 ASSESSMENT — PAIN - FUNCTIONAL ASSESSMENT: PAIN_FUNCTIONAL_ASSESSMENT: 0-10

## 2023-03-03 NOTE — PROGRESS NOTES
Pt tolerates infusion well. Discussed order expires on April 6th. Patient voices unable to travel to [place] hopes she can still get infusion here.  Discharged to home in stable condition

## 2023-03-31 ENCOUNTER — HOSPITAL ENCOUNTER (OUTPATIENT)
Dept: NURSING | Age: 68
Setting detail: INFUSION SERIES
Discharge: HOME | End: 2023-03-31
Payer: MEDICARE

## 2023-03-31 VITALS
HEART RATE: 71 BPM | BODY MASS INDEX: 30.16 KG/M2 | WEIGHT: 181 LBS | SYSTOLIC BLOOD PRESSURE: 137 MMHG | RESPIRATION RATE: 16 BRPM | HEIGHT: 65 IN | TEMPERATURE: 96.7 F | DIASTOLIC BLOOD PRESSURE: 62 MMHG | OXYGEN SATURATION: 99 %

## 2023-03-31 DIAGNOSIS — M06.9 RHEUMATOID ARTHRITIS, INVOLVING UNSPECIFIED SITE, UNSPECIFIED WHETHER RHEUMATOID FACTOR PRESENT (HCC): Primary | ICD-10-CM

## 2023-03-31 PROCEDURE — 99211 OFF/OP EST MAY X REQ PHY/QHP: CPT

## 2023-03-31 PROCEDURE — 2580000003 HC RX 258: Performed by: INTERNAL MEDICINE

## 2023-03-31 PROCEDURE — 96365 THER/PROPH/DIAG IV INF INIT: CPT

## 2023-03-31 PROCEDURE — 6360000002 HC RX W HCPCS: Performed by: INTERNAL MEDICINE

## 2023-03-31 RX ORDER — SODIUM CHLORIDE 9 MG/ML
INJECTION, SOLUTION INTRAVENOUS CONTINUOUS
Status: DISCONTINUED | OUTPATIENT
Start: 2023-03-31 | End: 2023-04-01 | Stop reason: HOSPADM

## 2023-03-31 RX ORDER — OMEGA-3S/DHA/EPA/FISH OIL/D3 300MG-1000
400 CAPSULE ORAL DAILY
COMMUNITY

## 2023-03-31 RX ORDER — SODIUM CHLORIDE 9 MG/ML
INJECTION, SOLUTION INTRAVENOUS CONTINUOUS
OUTPATIENT
Start: 2023-04-28

## 2023-03-31 RX ADMIN — SODIUM CHLORIDE 750 MG: 9 INJECTION, SOLUTION INTRAVENOUS at 13:32

## 2023-03-31 RX ADMIN — SODIUM CHLORIDE: 9 INJECTION, SOLUTION INTRAVENOUS at 13:13

## 2023-03-31 ASSESSMENT — PAIN - FUNCTIONAL ASSESSMENT: PAIN_FUNCTIONAL_ASSESSMENT: NONE - DENIES PAIN

## 2023-10-10 ENCOUNTER — HOSPITAL ENCOUNTER (EMERGENCY)
Age: 68
Discharge: HOME | End: 2023-10-11
Attending: STUDENT IN AN ORGANIZED HEALTH CARE EDUCATION/TRAINING PROGRAM
Payer: MEDICARE

## 2023-10-10 DIAGNOSIS — M25.561 ACUTE PAIN OF RIGHT KNEE: Primary | ICD-10-CM

## 2023-10-10 PROCEDURE — 96374 THER/PROPH/DIAG INJ IV PUSH: CPT

## 2023-10-10 PROCEDURE — 99284 EMERGENCY DEPT VISIT MOD MDM: CPT

## 2023-10-10 ASSESSMENT — PAIN - FUNCTIONAL ASSESSMENT: PAIN_FUNCTIONAL_ASSESSMENT: 0-10

## 2023-10-10 ASSESSMENT — PAIN SCALES - GENERAL: PAINLEVEL_OUTOF10: 6

## 2023-10-11 ENCOUNTER — APPOINTMENT (OUTPATIENT)
Dept: GENERAL RADIOLOGY | Age: 68
End: 2023-10-11
Payer: MEDICARE

## 2023-10-11 VITALS
DIASTOLIC BLOOD PRESSURE: 81 MMHG | BODY MASS INDEX: 29.41 KG/M2 | WEIGHT: 183 LBS | TEMPERATURE: 98.2 F | HEART RATE: 77 BPM | HEIGHT: 66 IN | SYSTOLIC BLOOD PRESSURE: 139 MMHG | RESPIRATION RATE: 18 BRPM | OXYGEN SATURATION: 98 %

## 2023-10-11 LAB
ALBUMIN SERPL-MCNC: 4.6 G/DL (ref 3.4–5)
ALBUMIN/GLOB SERPL: 2.1 {RATIO} (ref 1.1–2.2)
ALP SERPL-CCNC: 55 U/L (ref 40–129)
ALT SERPL-CCNC: 17 U/L (ref 10–40)
ANION GAP SERPL CALCULATED.3IONS-SCNC: 11 MMOL/L (ref 3–16)
AST SERPL-CCNC: 44 U/L (ref 15–37)
BASOPHILS # BLD: 0 K/UL (ref 0–0.2)
BASOPHILS NFR BLD: 0.2 %
BILIRUB SERPL-MCNC: 0.6 MG/DL (ref 0–1)
BUN SERPL-MCNC: 25 MG/DL (ref 7–20)
CALCIUM SERPL-MCNC: 9.6 MG/DL (ref 8.3–10.6)
CHLORIDE SERPL-SCNC: 107 MMOL/L (ref 99–110)
CO2 SERPL-SCNC: 22 MMOL/L (ref 21–32)
CREAT SERPL-MCNC: 0.9 MG/DL (ref 0.6–1.2)
D DIMER: 1.03 UG/ML FEU (ref 0–0.6)
DEPRECATED RDW RBC AUTO: 15.5 % (ref 12.4–15.4)
EOSINOPHIL # BLD: 0 K/UL (ref 0–0.6)
EOSINOPHIL NFR BLD: 0.4 %
GFR SERPLBLD CREATININE-BSD FMLA CKD-EPI: >60 ML/MIN/{1.73_M2}
GLUCOSE SERPL-MCNC: 99 MG/DL (ref 70–99)
HCT VFR BLD AUTO: 39.7 % (ref 36–48)
HGB BLD-MCNC: 12.9 G/DL (ref 12–16)
LYMPHOCYTES # BLD: 1.8 K/UL (ref 1–5.1)
LYMPHOCYTES NFR BLD: 22.7 %
MCH RBC QN AUTO: 32.9 PG (ref 26–34)
MCHC RBC AUTO-ENTMCNC: 32.4 G/DL (ref 31–36)
MCV RBC AUTO: 101.5 FL (ref 80–100)
MONOCYTES # BLD: 0.8 K/UL (ref 0–1.3)
MONOCYTES NFR BLD: 10.4 %
NEUTROPHILS # BLD: 5.3 K/UL (ref 1.7–7.7)
NEUTROPHILS NFR BLD: 66.3 %
PLATELET # BLD AUTO: 278 K/UL (ref 135–450)
PMV BLD AUTO: 7.8 FL (ref 5–10.5)
POTASSIUM SERPL-SCNC: 5.2 MMOL/L (ref 3.5–5.1)
PROT SERPL-MCNC: 6.8 G/DL (ref 6.4–8.2)
RBC # BLD AUTO: 3.92 M/UL (ref 4–5.2)
SODIUM SERPL-SCNC: 140 MMOL/L (ref 136–145)
WBC # BLD AUTO: 8 K/UL (ref 4–11)

## 2023-10-11 PROCEDURE — 6360000002 HC RX W HCPCS: Performed by: STUDENT IN AN ORGANIZED HEALTH CARE EDUCATION/TRAINING PROGRAM

## 2023-10-11 PROCEDURE — 73562 X-RAY EXAM OF KNEE 3: CPT

## 2023-10-11 PROCEDURE — 96374 THER/PROPH/DIAG INJ IV PUSH: CPT

## 2023-10-11 PROCEDURE — 85379 FIBRIN DEGRADATION QUANT: CPT

## 2023-10-11 PROCEDURE — 80053 COMPREHEN METABOLIC PANEL: CPT

## 2023-10-11 PROCEDURE — 6370000000 HC RX 637 (ALT 250 FOR IP): Performed by: STUDENT IN AN ORGANIZED HEALTH CARE EDUCATION/TRAINING PROGRAM

## 2023-10-11 PROCEDURE — 85025 COMPLETE CBC W/AUTO DIFF WBC: CPT

## 2023-10-11 RX ORDER — PREDNISONE 20 MG/1
40 TABLET ORAL ONCE
Status: COMPLETED | OUTPATIENT
Start: 2023-10-11 | End: 2023-10-11

## 2023-10-11 RX ORDER — IBUPROFEN 600 MG/1
600 TABLET ORAL ONCE
Status: COMPLETED | OUTPATIENT
Start: 2023-10-11 | End: 2023-10-11

## 2023-10-11 RX ORDER — ORPHENADRINE CITRATE 30 MG/ML
60 INJECTION INTRAMUSCULAR; INTRAVENOUS ONCE
Status: COMPLETED | OUTPATIENT
Start: 2023-10-11 | End: 2023-10-11

## 2023-10-11 RX ORDER — PREDNISONE 50 MG/1
50 TABLET ORAL DAILY
Qty: 5 TABLET | Refills: 0 | Status: SHIPPED | OUTPATIENT
Start: 2023-10-11 | End: 2023-10-16

## 2023-10-11 RX ORDER — ACETAMINOPHEN 500 MG
1000 TABLET ORAL ONCE
Status: COMPLETED | OUTPATIENT
Start: 2023-10-11 | End: 2023-10-11

## 2023-10-11 RX ADMIN — PREDNISONE 40 MG: 20 TABLET ORAL at 03:36

## 2023-10-11 RX ADMIN — IBUPROFEN 600 MG: 600 TABLET, FILM COATED ORAL at 01:17

## 2023-10-11 RX ADMIN — APIXABAN 10 MG: 5 TABLET, FILM COATED ORAL at 03:36

## 2023-10-11 RX ADMIN — ACETAMINOPHEN 1000 MG: 500 TABLET ORAL at 01:16

## 2023-10-11 RX ADMIN — ORPHENADRINE CITRATE 60 MG: 60 INJECTION INTRAMUSCULAR; INTRAVENOUS at 01:17

## 2023-10-11 ASSESSMENT — PAIN SCALES - GENERAL: PAINLEVEL_OUTOF10: 5

## 2023-10-11 NOTE — ED NOTES
Discharge instruction given to the patient and verbalized understanding.      Montez Fox RN  10/11/23 [id number]

## 2023-10-12 ENCOUNTER — HOSPITAL ENCOUNTER (OUTPATIENT)
Dept: VASCULAR LAB | Age: 68
Discharge: HOME | End: 2023-10-12
Payer: MEDICARE

## 2023-10-12 DIAGNOSIS — M25.561 ACUTE PAIN OF RIGHT KNEE: ICD-10-CM

## 2023-10-12 PROCEDURE — 93971 EXTREMITY STUDY: CPT

## 2024-06-03 ENCOUNTER — HOSPITAL ENCOUNTER (OUTPATIENT)
Dept: GENERAL RADIOLOGY | Age: 69
Discharge: HOME | End: 2024-06-03
Payer: MEDICARE

## 2024-06-03 ENCOUNTER — HOSPITAL ENCOUNTER (OUTPATIENT)
Age: 69
Discharge: HOME | End: 2024-06-03
Payer: MEDICARE

## 2024-06-03 DIAGNOSIS — R05.3 CHRONIC COUGH: ICD-10-CM

## 2024-06-03 PROCEDURE — 71046 X-RAY EXAM CHEST 2 VIEWS: CPT

## 2024-07-17 NOTE — PROGRESS NOTES
[facility]  DIVISION OF OTOLARYNGOLOGY- HEAD & NECK SURGERY  CONSULT    Patient Name: Maria Antonia Noriega  Medical Record Number:  [id number]  Primary Care Physician:  Arnold Gaviria DO  Date of Consultation: 7/18/2024    Chief Complaint:   Chief Complaint   Patient presents with    New Patient     Patient is being seen for productive cough.       HISTORY OF PRESENT ILLNESS  Maria Antonia is a(n) 69 y.o. female who presents for evaluation of cough for the last 4 months.  She states that she lost her  in February and has been very anxious and depressed about that.  She denies any overt acid reflux.  The cough is sometimes productive.  She did change her lisinopril to losartan and that did not seem to help.  She also feels as though she has mucus in her throat that will not clear  Patient Active Problem List   Diagnosis    Seropositive rheumatoid arthritis (HCC)    Chronic rheumatic arthritis (HCC)     No past surgical history on file.  No family history on file.  Social History     Socioeconomic History    Marital status:      Spouse name: Not on file    Number of children: Not on file    Years of education: Not on file    Highest education level: Not on file   Occupational History    Not on file   Tobacco Use    Smoking status: Never    Smokeless tobacco: Never   Vaping Use    Vaping Use: Never used   Substance and Sexual Activity    Alcohol use: Never    Drug use: Never    Sexual activity: Not Currently   Other Topics Concern    Not on file   Social History Narrative    Not on file     Social Determinants of Health     Financial Resource Strain: Not on file   Food Insecurity: Not on file   Transportation Needs: Not on file   Physical Activity: Not on file   Stress: Not on file   Social Connections: Not on file   Intimate Partner Violence: Not on file   Housing Stability: Not on file     DRUG/FOOD ALLERGIES: Gabapentin  CURRENT MEDICATIONS  Prior to Admission medications    Medication Sig Start Date End

## 2024-07-18 ENCOUNTER — OFFICE VISIT (OUTPATIENT)
Dept: ENT CLINIC | Age: 69
End: 2024-07-18
Payer: MEDICARE

## 2024-07-18 VITALS
DIASTOLIC BLOOD PRESSURE: 80 MMHG | WEIGHT: 181 LBS | SYSTOLIC BLOOD PRESSURE: 136 MMHG | BODY MASS INDEX: 29.09 KG/M2 | HEIGHT: 66 IN | RESPIRATION RATE: 16 BRPM | HEART RATE: 80 BPM

## 2024-07-18 DIAGNOSIS — K21.9 LARYNGOPHARYNGEAL REFLUX (LPR): Primary | ICD-10-CM

## 2024-07-18 DIAGNOSIS — R05.3 HABITUAL COUGH: ICD-10-CM

## 2024-07-18 PROCEDURE — 1090F PRES/ABSN URINE INCON ASSESS: CPT | Performed by: STUDENT IN AN ORGANIZED HEALTH CARE EDUCATION/TRAINING PROGRAM

## 2024-07-18 PROCEDURE — 1036F TOBACCO NON-USER: CPT | Performed by: STUDENT IN AN ORGANIZED HEALTH CARE EDUCATION/TRAINING PROGRAM

## 2024-07-18 PROCEDURE — G8427 DOCREV CUR MEDS BY ELIG CLIN: HCPCS | Performed by: STUDENT IN AN ORGANIZED HEALTH CARE EDUCATION/TRAINING PROGRAM

## 2024-07-18 PROCEDURE — 1123F ACP DISCUSS/DSCN MKR DOCD: CPT | Performed by: STUDENT IN AN ORGANIZED HEALTH CARE EDUCATION/TRAINING PROGRAM

## 2024-07-18 PROCEDURE — G8399 PT W/DXA RESULTS DOCUMENT: HCPCS | Performed by: STUDENT IN AN ORGANIZED HEALTH CARE EDUCATION/TRAINING PROGRAM

## 2024-07-18 PROCEDURE — 3017F COLORECTAL CA SCREEN DOC REV: CPT | Performed by: STUDENT IN AN ORGANIZED HEALTH CARE EDUCATION/TRAINING PROGRAM

## 2024-07-18 PROCEDURE — G8419 CALC BMI OUT NRM PARAM NOF/U: HCPCS | Performed by: STUDENT IN AN ORGANIZED HEALTH CARE EDUCATION/TRAINING PROGRAM

## 2024-07-18 PROCEDURE — 99204 OFFICE O/P NEW MOD 45 MIN: CPT | Performed by: STUDENT IN AN ORGANIZED HEALTH CARE EDUCATION/TRAINING PROGRAM

## 2024-07-18 PROCEDURE — 31575 DIAGNOSTIC LARYNGOSCOPY: CPT | Performed by: STUDENT IN AN ORGANIZED HEALTH CARE EDUCATION/TRAINING PROGRAM

## 2024-07-18 RX ORDER — LOSARTAN POTASSIUM 25 MG/1
25 TABLET ORAL DAILY
COMMUNITY

## 2024-07-18 RX ORDER — METHYLPREDNISOLONE 4 MG/1
TABLET ORAL
Qty: 1 KIT | Refills: 0 | Status: SHIPPED | OUTPATIENT
Start: 2024-07-18 | End: 2024-07-24

## 2024-07-18 RX ORDER — OMEPRAZOLE 20 MG/1
20 CAPSULE, DELAYED RELEASE ORAL
Qty: 30 CAPSULE | Refills: 0 | Status: SHIPPED | OUTPATIENT
Start: 2024-07-18

## 2024-07-18 RX ORDER — CITALOPRAM HYDROBROMIDE 10 MG/1
10 TABLET ORAL DAILY
COMMUNITY

## 2024-07-18 ASSESSMENT — ENCOUNTER SYMPTOMS
EYE PAIN: 0
SHORTNESS OF BREATH: 0
TROUBLE SWALLOWING: 1
RHINORRHEA: 0
NAUSEA: 0
VOMITING: 0
COUGH: 1

## 2024-07-31 DIAGNOSIS — K21.9 LARYNGOPHARYNGEAL REFLUX (LPR): ICD-10-CM

## 2024-08-01 RX ORDER — OMEPRAZOLE 20 MG/1
20 CAPSULE, DELAYED RELEASE ORAL
Qty: 30 CAPSULE | Refills: 3 | Status: SHIPPED | OUTPATIENT
Start: 2024-08-01

## 2024-09-20 ENCOUNTER — OFFICE VISIT (OUTPATIENT)
Dept: PRIMARY CARE CLINIC | Age: 69
End: 2024-09-20
Payer: MEDICARE

## 2024-09-20 ENCOUNTER — HOSPITAL ENCOUNTER (OUTPATIENT)
Age: 69
Discharge: HOME | End: 2024-09-20
Payer: MEDICARE

## 2024-09-20 VITALS
BODY MASS INDEX: 31.93 KG/M2 | TEMPERATURE: 98 F | WEIGHT: 197.8 LBS | HEART RATE: 59 BPM | OXYGEN SATURATION: 97 % | SYSTOLIC BLOOD PRESSURE: 106 MMHG | DIASTOLIC BLOOD PRESSURE: 68 MMHG

## 2024-09-20 DIAGNOSIS — I10 BENIGN ESSENTIAL HTN: ICD-10-CM

## 2024-09-20 DIAGNOSIS — E03.9 ACQUIRED HYPOTHYROIDISM: ICD-10-CM

## 2024-09-20 DIAGNOSIS — M05.9 SEROPOSITIVE RHEUMATOID ARTHRITIS (HCC): ICD-10-CM

## 2024-09-20 DIAGNOSIS — K21.9 LARYNGOPHARYNGEAL REFLUX (LPR): ICD-10-CM

## 2024-09-20 DIAGNOSIS — E78.2 MIXED HYPERLIPIDEMIA: ICD-10-CM

## 2024-09-20 DIAGNOSIS — Z76.89 ENCOUNTER TO ESTABLISH CARE WITH NEW DOCTOR: Primary | ICD-10-CM

## 2024-09-20 DIAGNOSIS — F41.8 DEPRESSION WITH ANXIETY: ICD-10-CM

## 2024-09-20 LAB
ALBUMIN SERPL-MCNC: 4.5 G/DL (ref 3.4–5)
ALBUMIN/GLOB SERPL: 2.4 {RATIO} (ref 1.1–2.2)
ALP SERPL-CCNC: 56 U/L (ref 40–129)
ALT SERPL-CCNC: 22 U/L (ref 10–40)
ANION GAP SERPL CALCULATED.3IONS-SCNC: 10 MMOL/L (ref 3–16)
AST SERPL-CCNC: 30 U/L (ref 15–37)
BASOPHILS # BLD: 0 K/UL (ref 0–0.2)
BASOPHILS NFR BLD: 1.1 %
BILIRUB SERPL-MCNC: 0.5 MG/DL (ref 0–1)
BUN SERPL-MCNC: 19 MG/DL (ref 7–20)
CALCIUM SERPL-MCNC: 10 MG/DL (ref 8.3–10.6)
CHLORIDE SERPL-SCNC: 106 MMOL/L (ref 99–110)
CHOLEST SERPL-MCNC: 199 MG/DL (ref 0–199)
CK SERPL-CCNC: 125 U/L (ref 26–192)
CO2 SERPL-SCNC: 26 MMOL/L (ref 21–32)
CREAT SERPL-MCNC: 0.9 MG/DL (ref 0.6–1.2)
CRP SERPL-MCNC: <3 MG/L (ref 0–5.1)
DEPRECATED RDW RBC AUTO: 14.2 % (ref 12.4–15.4)
EOSINOPHIL # BLD: 0.1 K/UL (ref 0–0.6)
EOSINOPHIL NFR BLD: 1.3 %
ERYTHROCYTE [SEDIMENTATION RATE] IN BLOOD BY WESTERGREN METHOD: 1 MM/HR (ref 0–30)
GFR SERPLBLD CREATININE-BSD FMLA CKD-EPI: 70 ML/MIN/{1.73_M2}
GLUCOSE SERPL-MCNC: 80 MG/DL (ref 70–99)
HCT VFR BLD AUTO: 38.7 % (ref 36–48)
HDLC SERPL-MCNC: 57 MG/DL (ref 40–60)
HGB BLD-MCNC: 13 G/DL (ref 12–16)
LDLC SERPL CALC-MCNC: 116 MG/DL
LYMPHOCYTES # BLD: 0.8 K/UL (ref 1–5.1)
LYMPHOCYTES NFR BLD: 20.2 %
MCH RBC QN AUTO: 34.1 PG (ref 26–34)
MCHC RBC AUTO-ENTMCNC: 33.7 G/DL (ref 31–36)
MCV RBC AUTO: 101 FL (ref 80–100)
MONOCYTES # BLD: 0.5 K/UL (ref 0–1.3)
MONOCYTES NFR BLD: 12.2 %
NEUTROPHILS # BLD: 2.6 K/UL (ref 1.7–7.7)
NEUTROPHILS NFR BLD: 65.2 %
PLATELET # BLD AUTO: 235 K/UL (ref 135–450)
PMV BLD AUTO: 9 FL (ref 5–10.5)
POTASSIUM SERPL-SCNC: 4 MMOL/L (ref 3.5–5.1)
PROT SERPL-MCNC: 6.4 G/DL (ref 6.4–8.2)
RBC # BLD AUTO: 3.83 M/UL (ref 4–5.2)
SODIUM SERPL-SCNC: 142 MMOL/L (ref 136–145)
TRIGL SERPL-MCNC: 132 MG/DL (ref 0–150)
TSH SERPL DL<=0.005 MIU/L-ACNC: 1.86 UIU/ML (ref 0.27–4.2)
VLDLC SERPL CALC-MCNC: 26 MG/DL
WBC # BLD AUTO: 3.9 K/UL (ref 4–11)

## 2024-09-20 PROCEDURE — 99204 OFFICE O/P NEW MOD 45 MIN: CPT | Performed by: FAMILY MEDICINE

## 2024-09-20 PROCEDURE — 3078F DIAST BP <80 MM HG: CPT | Performed by: FAMILY MEDICINE

## 2024-09-20 PROCEDURE — 80061 LIPID PANEL: CPT

## 2024-09-20 PROCEDURE — 1123F ACP DISCUSS/DSCN MKR DOCD: CPT | Performed by: FAMILY MEDICINE

## 2024-09-20 PROCEDURE — 86140 C-REACTIVE PROTEIN: CPT

## 2024-09-20 PROCEDURE — 84443 ASSAY THYROID STIM HORMONE: CPT

## 2024-09-20 PROCEDURE — 85025 COMPLETE CBC W/AUTO DIFF WBC: CPT

## 2024-09-20 PROCEDURE — G8417 CALC BMI ABV UP PARAM F/U: HCPCS | Performed by: FAMILY MEDICINE

## 2024-09-20 PROCEDURE — 36415 COLL VENOUS BLD VENIPUNCTURE: CPT

## 2024-09-20 PROCEDURE — 85652 RBC SED RATE AUTOMATED: CPT

## 2024-09-20 PROCEDURE — 1036F TOBACCO NON-USER: CPT | Performed by: FAMILY MEDICINE

## 2024-09-20 PROCEDURE — G8427 DOCREV CUR MEDS BY ELIG CLIN: HCPCS | Performed by: FAMILY MEDICINE

## 2024-09-20 PROCEDURE — G8399 PT W/DXA RESULTS DOCUMENT: HCPCS | Performed by: FAMILY MEDICINE

## 2024-09-20 PROCEDURE — 3017F COLORECTAL CA SCREEN DOC REV: CPT | Performed by: FAMILY MEDICINE

## 2024-09-20 PROCEDURE — 3074F SYST BP LT 130 MM HG: CPT | Performed by: FAMILY MEDICINE

## 2024-09-20 PROCEDURE — 82550 ASSAY OF CK (CPK): CPT

## 2024-09-20 PROCEDURE — 80053 COMPREHEN METABOLIC PANEL: CPT

## 2024-09-20 PROCEDURE — 1090F PRES/ABSN URINE INCON ASSESS: CPT | Performed by: FAMILY MEDICINE

## 2024-09-20 RX ORDER — CITALOPRAM HYDROBROMIDE 10 MG/1
10 TABLET ORAL DAILY
Qty: 90 TABLET | Refills: 3 | Status: SHIPPED | OUTPATIENT
Start: 2024-09-20

## 2024-09-20 RX ORDER — LOSARTAN POTASSIUM 25 MG/1
25 TABLET ORAL DAILY
Qty: 90 TABLET | Refills: 3 | Status: SHIPPED | OUTPATIENT
Start: 2024-09-20

## 2024-09-20 SDOH — ECONOMIC STABILITY: INCOME INSECURITY: HOW HARD IS IT FOR YOU TO PAY FOR THE VERY BASICS LIKE FOOD, HOUSING, MEDICAL CARE, AND HEATING?: NOT VERY HARD

## 2024-09-20 SDOH — ECONOMIC STABILITY: FOOD INSECURITY: WITHIN THE PAST 12 MONTHS, THE FOOD YOU BOUGHT JUST DIDN'T LAST AND YOU DIDN'T HAVE MONEY TO GET MORE.: NEVER TRUE

## 2024-09-20 SDOH — ECONOMIC STABILITY: FOOD INSECURITY: WITHIN THE PAST 12 MONTHS, YOU WORRIED THAT YOUR FOOD WOULD RUN OUT BEFORE YOU GOT MONEY TO BUY MORE.: NEVER TRUE

## 2024-09-20 ASSESSMENT — PATIENT HEALTH QUESTIONNAIRE - PHQ9
SUM OF ALL RESPONSES TO PHQ QUESTIONS 1-9: 0
1. LITTLE INTEREST OR PLEASURE IN DOING THINGS: NOT AT ALL
SUM OF ALL RESPONSES TO PHQ QUESTIONS 1-9: 0
SUM OF ALL RESPONSES TO PHQ9 QUESTIONS 1 & 2: 0
2. FEELING DOWN, DEPRESSED OR HOPELESS: NOT AT ALL

## 2024-09-23 ASSESSMENT — ENCOUNTER SYMPTOMS
CHEST TIGHTNESS: 0
SINUS PAIN: 0
SINUS PRESSURE: 0
TROUBLE SWALLOWING: 0
NAUSEA: 0
ABDOMINAL PAIN: 0
COUGH: 0
DIARRHEA: 0

## 2024-09-26 DIAGNOSIS — E78.2 MIXED HYPERLIPIDEMIA: ICD-10-CM

## 2024-09-26 DIAGNOSIS — E03.9 ACQUIRED HYPOTHYROIDISM: Primary | ICD-10-CM

## 2024-09-26 RX ORDER — ATORVASTATIN CALCIUM 40 MG/1
40 TABLET, FILM COATED ORAL DAILY
Qty: 90 TABLET | Refills: 3 | Status: SHIPPED | OUTPATIENT
Start: 2024-09-26

## 2024-09-26 RX ORDER — LEVOTHYROXINE SODIUM 100 UG/1
100 TABLET ORAL DAILY
Qty: 90 TABLET | Refills: 3 | Status: SHIPPED | OUTPATIENT
Start: 2024-09-26

## 2024-10-14 ENCOUNTER — TELEPHONE (OUTPATIENT)
Dept: PRIMARY CARE CLINIC | Age: 69
End: 2024-10-14

## 2024-10-14 NOTE — TELEPHONE ENCOUNTER
----- Message from Eva LINDO sent at 10/14/2024  2:30 PM EDT -----  Regarding: ECC Escalation To Practice  ECC Escalation To Practice      Type of Escalation: Red Flag Symptom  --------------------------------------------------------------------------------------------------------------------------    Information for Provider: Marilyn Pacheco MD  Patient is looking for appointment for: Symptom  swelling  Reasons for Message: Unable to reach practice     Additional Information: Patient has something on her face, it's like a pimple and she stated that it's like infected and swelling.   --------------------------------------------------------------------------------------------------------------------------    Relationship to Patient: Self     Call Back Info: OK to leave message on voicemail  Preferred Call Back Number: Phone [phone]

## 2024-10-15 ENCOUNTER — OFFICE VISIT (OUTPATIENT)
Dept: PRIMARY CARE CLINIC | Age: 69
End: 2024-10-15

## 2024-10-15 VITALS
DIASTOLIC BLOOD PRESSURE: 78 MMHG | BODY MASS INDEX: 31.8 KG/M2 | HEART RATE: 71 BPM | WEIGHT: 197 LBS | OXYGEN SATURATION: 96 % | SYSTOLIC BLOOD PRESSURE: 122 MMHG

## 2024-10-15 DIAGNOSIS — L03.211 CELLULITIS OF FACE: Primary | ICD-10-CM

## 2024-10-15 RX ORDER — SULFAMETHOXAZOLE/TRIMETHOPRIM 800-160 MG
1 TABLET ORAL 2 TIMES DAILY
Qty: 14 TABLET | Refills: 0 | Status: SHIPPED | OUTPATIENT
Start: 2024-10-15 | End: 2024-10-22

## 2024-10-15 NOTE — PROGRESS NOTES
erythematous lesion over her right check with some honey crust appearance. There is some mild tenderness. Appearance is consistent with bacterial cellulitis. Shingles also considered but less likely given appearance and only being mildly tender with no neurologic symptoms. We will treat with 7 days of Bactrim DS for MRSA coverage.  - sulfamethoxazole-trimethoprim (BACTRIM DS;SEPTRA DS) 800-160 MG per tablet; Take 1 tablet by mouth 2 times daily for 7 days  Dispense: 14 tablet; Refill: 0  - Return to clinic if no improvement

## 2024-12-13 DIAGNOSIS — K21.9 LARYNGOPHARYNGEAL REFLUX (LPR): ICD-10-CM

## 2024-12-16 ENCOUNTER — TELEPHONE (OUTPATIENT)
Dept: PRIMARY CARE CLINIC | Age: 69
End: 2024-12-16

## 2024-12-16 DIAGNOSIS — F41.8 DEPRESSION WITH ANXIETY: ICD-10-CM

## 2024-12-16 RX ORDER — CITALOPRAM HYDROBROMIDE 20 MG/1
20 TABLET ORAL DAILY
Qty: 90 TABLET | Refills: 0 | Status: SHIPPED | OUTPATIENT
Start: 2024-12-16

## 2024-12-16 NOTE — TELEPHONE ENCOUNTER
Patient is calling about her medication citalopram (CELEXA) 10 MG tablet  patient is saying the medication the 10 mg is not helping her she was asking her doctor if she can have bump up to 20 mg tablet because she is having hard time     Please advise   Patient name : ronald lopez  Patients ph :[phone]

## 2024-12-16 NOTE — TELEPHONE ENCOUNTER
Refill Request     Last Seen: 10/15/2024    Last Written: 12/2024    Next Appointment:   No future appointments.          Requested Prescriptions     Pending Prescriptions Disp Refills    omeprazole (PRILOSEC) 20 MG delayed release capsule [Pharmacy Med Name: OMEPRAZOLE DR 20 MG CAPSULE] 90 capsule 1     Sig: TAKE 1 CAPSULE BY MOUTH DAILY AS NEEDED (HEARTBURN)

## 2024-12-16 NOTE — TELEPHONE ENCOUNTER
Changed the med to 20 mg, please have patient schedule follow up appointment within 3 weeks to se licea dose adjustment does

## 2025-01-23 DIAGNOSIS — K21.9 LARYNGOPHARYNGEAL REFLUX (LPR): ICD-10-CM

## 2025-01-23 NOTE — TELEPHONE ENCOUNTER
Refill Request     Last Seen: 10/15/2024    Last Written: Ordered On: 09/20/2024     Next Appointment:   No future appointments.          Requested Prescriptions     Pending Prescriptions Disp Refills    omeprazole (PRILOSEC) 20 MG delayed release capsule [Pharmacy Med Name: OMEPRAZOLE DR 20 MG CAPSULE] 90 capsule 1     Sig: TAKE 1 CAPSULE BY MOUTH DAILY AS NEEDED (HEARTBURN)

## 2025-02-03 ENCOUNTER — OFFICE VISIT (OUTPATIENT)
Dept: PRIMARY CARE CLINIC | Age: 70
End: 2025-02-03

## 2025-02-03 VITALS
HEART RATE: 69 BPM | OXYGEN SATURATION: 95 % | HEIGHT: 66 IN | DIASTOLIC BLOOD PRESSURE: 98 MMHG | SYSTOLIC BLOOD PRESSURE: 140 MMHG | WEIGHT: 203 LBS | BODY MASS INDEX: 32.62 KG/M2

## 2025-02-03 DIAGNOSIS — Z00.00 INITIAL MEDICARE ANNUAL WELLNESS VISIT: Primary | ICD-10-CM

## 2025-02-03 DIAGNOSIS — F41.8 DEPRESSION WITH ANXIETY: ICD-10-CM

## 2025-02-03 DIAGNOSIS — Z12.11 COLON CANCER SCREENING: ICD-10-CM

## 2025-02-03 DIAGNOSIS — E78.2 MIXED HYPERLIPIDEMIA: ICD-10-CM

## 2025-02-03 DIAGNOSIS — Z12.31 ENCOUNTER FOR SCREENING MAMMOGRAM FOR MALIGNANT NEOPLASM OF BREAST: ICD-10-CM

## 2025-02-03 DIAGNOSIS — Z23 IMMUNIZATION DUE: ICD-10-CM

## 2025-02-03 DIAGNOSIS — I10 BENIGN ESSENTIAL HTN: ICD-10-CM

## 2025-02-03 DIAGNOSIS — H91.90 HEARING LOSS, UNSPECIFIED HEARING LOSS TYPE, UNSPECIFIED LATERALITY: ICD-10-CM

## 2025-02-03 DIAGNOSIS — K21.9 LARYNGOPHARYNGEAL REFLUX (LPR): ICD-10-CM

## 2025-02-03 SDOH — ECONOMIC STABILITY: FOOD INSECURITY: WITHIN THE PAST 12 MONTHS, THE FOOD YOU BOUGHT JUST DIDN'T LAST AND YOU DIDN'T HAVE MONEY TO GET MORE.: NEVER TRUE

## 2025-02-03 SDOH — ECONOMIC STABILITY: FOOD INSECURITY: WITHIN THE PAST 12 MONTHS, YOU WORRIED THAT YOUR FOOD WOULD RUN OUT BEFORE YOU GOT MONEY TO BUY MORE.: NEVER TRUE

## 2025-02-03 ASSESSMENT — PATIENT HEALTH QUESTIONNAIRE - PHQ9
1. LITTLE INTEREST OR PLEASURE IN DOING THINGS: NEARLY EVERY DAY
SUM OF ALL RESPONSES TO PHQ QUESTIONS 1-9: 3

## 2025-02-03 ASSESSMENT — LIFESTYLE VARIABLES
HOW MANY STANDARD DRINKS CONTAINING ALCOHOL DO YOU HAVE ON A TYPICAL DAY: PATIENT DOES NOT DRINK
HOW OFTEN DO YOU HAVE A DRINK CONTAINING ALCOHOL: NEVER

## 2025-02-03 NOTE — PROGRESS NOTES
Medicare Annual Wellness Visit    Maria Antonia Noriega is here for Medicare AWV    Assessment & Plan   Initial Medicare annual wellness visit  - No acute concerns today. Counseled on positive risk factors per below, including maintaining adequate moderate-intensity exercise and healthy diet.   - Repeat BP remains elevated. Taking all meds as prescribed except for Omeprazole 20mg. Tolerating all meds well.   - HCM: Due for mammogram, never Had C-scope. Counseled on routine cancer screening.   - f/u 3 months  Hearing loss, unspecified hearing loss type, unspecified laterality  - Chronic bilateral hearing loss. Bilat ear exam unremarkable.   -     Fannie Putnam AU.D., Audiology, [facility]  Encounter for screening mammogram for malignant neoplasm of breast  -     [facility] HARJIT DIGITAL SCREEN BILATERAL; Future  Colon cancer screening  -     KACY - Shakira Ross MD, Gastroenterology, [facility]  Laryngopharyngeal reflux (LPR)  -No longer on omeprazole 20 mg daily, patient has no new concerns today.  Depression with anxiety  -Tolerating Celexa 20 mg daily well, patient has no new concerns today  Benign essential HTN  - Remaining elevated on repeat. Possibly d/t white-coat hypertension given patient nervous in office.  - No acute cardio-resp symptoms  - Cont current regimen losartan 25 mg daily  - Provided home BP log, rec measure a few days weekly and call back if persistent > 140/80.  - f/u 3 months   Mixed hyperlipidemia  -Tolerating atorvastatin 40 mg well, will continue  Immunization due  -  Flu and PCV20 declined by pt,     Return in 3 months (on 5/3/2025) for Chronic Care.     Subjective     Patient's complete Health Risk Assessment and screening values have been reviewed and are found in Flowsheets. The following problems were reviewed today and where indicated follow up appointments were made and/or referrals ordered.      Positive Risk Factor Screenings with Interventions:    Fall Risk:  Do you feel

## 2025-04-04 ENCOUNTER — OFFICE VISIT (OUTPATIENT)
Dept: PRIMARY CARE CLINIC | Age: 70
End: 2025-04-04

## 2025-04-04 VITALS
HEIGHT: 66 IN | SYSTOLIC BLOOD PRESSURE: 120 MMHG | HEART RATE: 75 BPM | DIASTOLIC BLOOD PRESSURE: 72 MMHG | WEIGHT: 200.2 LBS | RESPIRATION RATE: 18 BRPM | OXYGEN SATURATION: 98 % | BODY MASS INDEX: 32.17 KG/M2

## 2025-04-04 DIAGNOSIS — L63.9 ALOPECIA AREATA: ICD-10-CM

## 2025-04-04 DIAGNOSIS — M05.9 SEROPOSITIVE RHEUMATOID ARTHRITIS (HCC): Primary | ICD-10-CM

## 2025-04-04 RX ORDER — TERBINAFINE HYDROCHLORIDE 250 MG/1
250 TABLET ORAL DAILY
Qty: 72 TABLET | Refills: 0 | Status: CANCELLED | OUTPATIENT
Start: 2025-04-04 | End: 2025-06-15

## 2025-04-04 RX ORDER — SELENIUM SULFIDE 22.5 MG/ML
SHAMPOO TOPICAL
Qty: 180 ML | Refills: 0 | Status: CANCELLED | OUTPATIENT
Start: 2025-04-04

## 2025-04-04 NOTE — PROGRESS NOTES
PROGRESS NOTE   [facility]                                  [address], [address], [place] [address]         Phone: [phone]    Date of Service:  4/4/2025     Patient ID: Zahra Noriega is a 69 y.o. female      Subjective:     CC: Scalp problem    HPI  Patient is 69 year-old female with pmh of hypertension, anxiety, and hypothyroidism who presents with scalp lesion. Over the past month, patient has two spots of baldness in hairline above forehead. Patient denies lesions causing any pain and states there is some mild itchiness but mostly that patient is unable to stop picking at it. She denies any other symptoms at this time. She denies fevers, nausea, vomiting, fatigue. Patient is a generally anxious person but denies any recent stressors.    ROS:    Constitutional:  Negative for activity or appetite change, fever or fatigue  HENT:  Negative for congestion, sinus pressure, or rhinorrhea  Eyes:  Negative for eye pain or visual changes  Resp:  Negative for SOB, chest tightness, cough  Cardiovascular: Negative for CP, palpitations, KRAMER, orthopnea, PND, LE edema  Gastrointestinal: Negative for abd pain, melena, BRBPR, N/V/D  Endocrine:  Negative for polydipsia and polyuria  :  Negative for dysuria, flank pain or urinary frequency  Musculoskeletal:  Negative for back pain or myalgias  Neuro:  Negative for dizziness or lightheadedness  Psych: negative for depression or anxiety        Vitals:    04/04/25 0939   BP: 120/72   BP Site: Left Upper Arm   Patient Position: Sitting   BP Cuff Size: Medium Adult   Pulse: 75   Resp: 18   SpO2: 98%   Weight: 90.8 kg (200 lb 3.2 oz)   Height: 1.676 m (5' 6\")       Allergies:  Gabapentin    Outpatient Medications Marked as Taking for the 4/4/25 encounter (Office Visit) with Jose Wynne MD   Medication Sig Dispense Refill    Multiple Vitamins-Minerals (CENTRUM ADULT 50+

## 2025-05-19 ENCOUNTER — OFFICE VISIT (OUTPATIENT)
Dept: PRIMARY CARE CLINIC | Age: 70
End: 2025-05-19
Payer: MEDICARE

## 2025-05-19 VITALS
SYSTOLIC BLOOD PRESSURE: 138 MMHG | HEART RATE: 61 BPM | HEIGHT: 66 IN | DIASTOLIC BLOOD PRESSURE: 72 MMHG | TEMPERATURE: 97.5 F | RESPIRATION RATE: 16 BRPM | OXYGEN SATURATION: 98 % | BODY MASS INDEX: 32.85 KG/M2 | WEIGHT: 204.4 LBS

## 2025-05-19 DIAGNOSIS — F41.8 DEPRESSION WITH ANXIETY: ICD-10-CM

## 2025-05-19 DIAGNOSIS — R23.8 SCALP IRRITATION: ICD-10-CM

## 2025-05-19 DIAGNOSIS — H91.93 DECREASED HEARING OF BOTH EARS: ICD-10-CM

## 2025-05-19 PROCEDURE — 1159F MED LIST DOCD IN RCRD: CPT | Performed by: FAMILY MEDICINE

## 2025-05-19 PROCEDURE — 1036F TOBACCO NON-USER: CPT | Performed by: FAMILY MEDICINE

## 2025-05-19 PROCEDURE — G8417 CALC BMI ABV UP PARAM F/U: HCPCS | Performed by: FAMILY MEDICINE

## 2025-05-19 PROCEDURE — 1090F PRES/ABSN URINE INCON ASSESS: CPT | Performed by: FAMILY MEDICINE

## 2025-05-19 PROCEDURE — G8399 PT W/DXA RESULTS DOCUMENT: HCPCS | Performed by: FAMILY MEDICINE

## 2025-05-19 PROCEDURE — 99214 OFFICE O/P EST MOD 30 MIN: CPT | Performed by: FAMILY MEDICINE

## 2025-05-19 PROCEDURE — 1123F ACP DISCUSS/DSCN MKR DOCD: CPT | Performed by: FAMILY MEDICINE

## 2025-05-19 PROCEDURE — G8427 DOCREV CUR MEDS BY ELIG CLIN: HCPCS | Performed by: FAMILY MEDICINE

## 2025-05-19 PROCEDURE — 3017F COLORECTAL CA SCREEN DOC REV: CPT | Performed by: FAMILY MEDICINE

## 2025-05-19 RX ORDER — CITALOPRAM HYDROBROMIDE 20 MG/1
20 TABLET ORAL DAILY
Qty: 90 TABLET | Refills: 2 | Status: SHIPPED | OUTPATIENT
Start: 2025-05-19

## 2025-05-19 NOTE — PATIENT INSTRUCTIONS
Good shampoo over the counter for itchy scaling scalp-- selsum blue, head/ shoulders, T-Gel    Return late September for labs/ physical

## 2025-05-19 NOTE — PROGRESS NOTES
Marilyn Pacheco MD  [facility]   [facility]   [address], [address]  [facility] [address]  Phone: [phone]  Fax: [phone]        SUBJECTIVE:  Ms. Noriega is a 69 y.o. female here for the chief complaint below:    1. Depression with anxiety --currently on citalopram 20 mg and doing well on this dose.  Refills are needed on this medication.  Has been on this many years since her  passed away.   2. Decreased hearing of both ears --this has been progressing over the last several years.  Has not had a hearing evaluation.  Patient is interested in hearing devices if available   3. Scalp irritation --this was evaluated last office visit about 6 weeks ago.  There was concern for alopecia and was referred to dermatology.  Fortunately the patches have resolved.  Patient states those areas were very dry, and resolved with changing of shampoos         Past Medical History:   Diagnosis Date    Arthritis     Rheumatois    Hypertension     Hypothyroidism         No past surgical history on file.     Current Outpatient Medications   Medication Sig Dispense Refill    citalopram (CELEXA) 20 MG tablet Take 1 tablet by mouth daily 90 tablet 2    Multiple Vitamins-Minerals (CENTRUM ADULT 50+ MULTIGUMMIES PO) Take 1 Dose by mouth daily      ZINC OXIDE PO Take by mouth      atorvastatin (LIPITOR) 40 MG tablet Take 1 tablet by mouth daily 90 tablet 3    levothyroxine (SYNTHROID) 100 MCG tablet Take 1 tablet by mouth daily 90 tablet 3    losartan (COZAAR) 25 MG tablet Take 1 tablet by mouth daily 90 tablet 3    vitamin D3 (CHOLECALCIFEROL) 10 MCG (400 UNIT) TABS tablet Take 1 tablet by mouth daily      folic acid (FOLVITE) 1 MG tablet Take 1 tab po daily. 90 tablet 3    omeprazole (PRILOSEC) 20 MG delayed release capsule TAKE 1 CAPSULE BY MOUTH DAILY AS NEEDED (HEARTBURN) (Patient not taking: Reported on 5/19/2025) 90 capsule 1    Upadacitinib ER 15 MG TB24

## 2025-06-11 ENCOUNTER — TELEPHONE (OUTPATIENT)
Dept: PRIMARY CARE CLINIC | Age: 70
End: 2025-06-11

## 2025-06-20 ENCOUNTER — OFFICE VISIT (OUTPATIENT)
Dept: PRIMARY CARE CLINIC | Age: 70
End: 2025-06-20

## 2025-06-20 VITALS
BODY MASS INDEX: 33.22 KG/M2 | OXYGEN SATURATION: 98 % | HEART RATE: 69 BPM | TEMPERATURE: 98.7 F | DIASTOLIC BLOOD PRESSURE: 74 MMHG | WEIGHT: 205.8 LBS | SYSTOLIC BLOOD PRESSURE: 138 MMHG | RESPIRATION RATE: 17 BRPM

## 2025-06-20 DIAGNOSIS — Z01.818 PREOP EXAMINATION: Primary | ICD-10-CM

## 2025-06-20 PROBLEM — E03.9 HYPOTHYROIDISM, ACQUIRED: Status: ACTIVE | Noted: 2025-06-20

## 2025-06-20 PROBLEM — I10 ESSENTIAL HYPERTENSION: Chronic | Status: ACTIVE | Noted: 2017-05-08

## 2025-06-20 PROBLEM — E78.2 MIXED HYPERLIPIDEMIA: Chronic | Status: ACTIVE | Noted: 2025-06-20

## 2025-06-20 ASSESSMENT — ENCOUNTER SYMPTOMS
SHORTNESS OF BREATH: 0
COUGH: 0
CHEST TIGHTNESS: 0
ABDOMINAL PAIN: 0

## 2025-06-20 NOTE — PROGRESS NOTES
Marilyn Pacheco MD  [facility]   [facility]   [address], [address]  [facility] [address]  Phone: [phone]  Fax: [phone]    PREOP PHYSICAL    Maria Antonia Noriega is a 70 y.o. female who is referred by Dr. Josh Shin  for a preoperative physical examination and medical clearance for surgery.  Pt has [facility] preop form here today for completion    She is scheduled to have phacoemulsification with intraocular lens implant of left and right eye at [facility] on 7/16/25 and 7/30/25.    Ms. Noriega has RA, HTN    Patient has no history of PE, DVT, or clotting disorder.    Anesthesia History:  She has not had an adverse reaction to anesthesia in the past.  No past surgical hx  Family members who have had adverse reactions and or malignant hyperthermia to anesthesia include: no    Patient Active Problem List   Diagnosis    Seropositive rheumatoid arthritis (HCC)    Chronic rheumatic arthritis (HCC)    Essential hypertension    Hypothyroidism, acquired    Mixed hyperlipidemia       No past surgical history on file.    Current Outpatient Medications   Medication Sig Dispense Refill    citalopram (CELEXA) 20 MG tablet Take 1 tablet by mouth daily 90 tablet 2    Multiple Vitamins-Minerals (CENTRUM ADULT 50+ MULTIGUMMIES PO) Take 1 Dose by mouth daily      ZINC OXIDE PO Take by mouth      omeprazole (PRILOSEC) 20 MG delayed release capsule TAKE 1 CAPSULE BY MOUTH DAILY AS NEEDED (HEARTBURN) 90 capsule 1    atorvastatin (LIPITOR) 40 MG tablet Take 1 tablet by mouth daily 90 tablet 3    levothyroxine (SYNTHROID) 100 MCG tablet Take 1 tablet by mouth daily 90 tablet 3    Upadacitinib ER 15 MG TB24 Take 1 tablet by mouth daily      losartan (COZAAR) 25 MG tablet Take 1 tablet by mouth daily 90 tablet 3    vitamin D3 (CHOLECALCIFEROL) 10 MCG (400 UNIT) TABS tablet Take 1 tablet by mouth daily      folic acid (FOLVITE) 1 MG tablet Take 1 tab po daily. 90 tablet 3

## 2025-08-21 DIAGNOSIS — I10 BENIGN ESSENTIAL HTN: ICD-10-CM

## 2025-08-21 RX ORDER — LOSARTAN POTASSIUM 25 MG/1
25 TABLET ORAL DAILY
Qty: 90 TABLET | Refills: 3 | Status: SHIPPED | OUTPATIENT
Start: 2025-08-21